# Patient Record
Sex: MALE | Race: WHITE | Employment: UNEMPLOYED | URBAN - METROPOLITAN AREA
[De-identification: names, ages, dates, MRNs, and addresses within clinical notes are randomized per-mention and may not be internally consistent; named-entity substitution may affect disease eponyms.]

---

## 2021-06-18 ENCOUNTER — HOSPITAL ENCOUNTER (EMERGENCY)
Age: 51
Discharge: HOME | End: 2021-06-18
Attending: STUDENT IN AN ORGANIZED HEALTH CARE EDUCATION/TRAINING PROGRAM
Payer: COMMERCIAL

## 2021-06-18 VITALS
HEART RATE: 89 BPM | TEMPERATURE: 98 F | DIASTOLIC BLOOD PRESSURE: 75 MMHG | RESPIRATION RATE: 18 BRPM | SYSTOLIC BLOOD PRESSURE: 132 MMHG | OXYGEN SATURATION: 97 %

## 2021-06-18 DIAGNOSIS — F15.10 METHAMPHETAMINE ABUSE (HCC): Primary | ICD-10-CM

## 2021-06-18 LAB
A/G RATIO: 1.4 (ref 1.1–2.2)
ACETAMINOPHEN LEVEL: <5 UG/ML (ref 10–30)
ALBUMIN SERPL-MCNC: 4.1 G/DL (ref 3.4–5)
ALP BLD-CCNC: 86 U/L (ref 40–129)
ALT SERPL-CCNC: 38 U/L (ref 10–40)
AMPHETAMINE SCREEN, URINE: POSITIVE
ANION GAP SERPL CALCULATED.3IONS-SCNC: 21 MMOL/L (ref 3–16)
AST SERPL-CCNC: 33 U/L (ref 15–37)
BARBITURATE SCREEN URINE: ABNORMAL
BASOPHILS ABSOLUTE: 0.1 K/UL (ref 0–0.2)
BASOPHILS RELATIVE PERCENT: 0.7 %
BENZODIAZEPINE SCREEN, URINE: ABNORMAL
BILIRUB SERPL-MCNC: 0.4 MG/DL (ref 0–1)
BUN BLDV-MCNC: 12 MG/DL (ref 7–20)
CALCIUM SERPL-MCNC: 9.3 MG/DL (ref 8.3–10.6)
CANNABINOID SCREEN URINE: POSITIVE
CHLORIDE BLD-SCNC: 102 MMOL/L (ref 99–110)
CO2: 16 MMOL/L (ref 21–32)
COCAINE METABOLITE SCREEN URINE: ABNORMAL
CREAT SERPL-MCNC: 0.9 MG/DL (ref 0.9–1.3)
EOSINOPHILS ABSOLUTE: 0.1 K/UL (ref 0–0.6)
EOSINOPHILS RELATIVE PERCENT: 0.6 %
ETHANOL: 37 MG/DL (ref 0–0.08)
GFR AFRICAN AMERICAN: >60
GFR NON-AFRICAN AMERICAN: >60
GLOBULIN: 3 G/DL
GLUCOSE BLD-MCNC: 124 MG/DL (ref 70–99)
HCT VFR BLD CALC: 39.6 % (ref 40.5–52.5)
HEMOGLOBIN: 13.2 G/DL (ref 13.5–17.5)
LYMPHOCYTES ABSOLUTE: 1.6 K/UL (ref 1–5.1)
LYMPHOCYTES RELATIVE PERCENT: 15.2 %
Lab: ABNORMAL
MAGNESIUM: 2.1 MG/DL (ref 1.8–2.4)
MCH RBC QN AUTO: 29.9 PG (ref 26–34)
MCHC RBC AUTO-ENTMCNC: 33.4 G/DL (ref 31–36)
MCV RBC AUTO: 89.6 FL (ref 80–100)
METHADONE SCREEN, URINE: ABNORMAL
MONOCYTES ABSOLUTE: 0.6 K/UL (ref 0–1.3)
MONOCYTES RELATIVE PERCENT: 5.4 %
NEUTROPHILS ABSOLUTE: 8 K/UL (ref 1.7–7.7)
NEUTROPHILS RELATIVE PERCENT: 78.1 %
OPIATE SCREEN URINE: ABNORMAL
OXYCODONE URINE: ABNORMAL
PDW BLD-RTO: 14.7 % (ref 12.4–15.4)
PH UA: 5
PHENCYCLIDINE SCREEN URINE: ABNORMAL
PLATELET # BLD: 247 K/UL (ref 135–450)
PMV BLD AUTO: 8.9 FL (ref 5–10.5)
POTASSIUM REFLEX MAGNESIUM: 3.5 MMOL/L (ref 3.5–5.1)
PROPOXYPHENE SCREEN: ABNORMAL
RBC # BLD: 4.42 M/UL (ref 4.2–5.9)
SALICYLATE, SERUM: <0.3 MG/DL (ref 15–30)
SARS-COV-2, NAAT: NOT DETECTED
SODIUM BLD-SCNC: 139 MMOL/L (ref 136–145)
TOTAL PROTEIN: 7.1 G/DL (ref 6.4–8.2)
WBC # BLD: 10.2 K/UL (ref 4–11)

## 2021-06-18 PROCEDURE — 2580000003 HC RX 258: Performed by: STUDENT IN AN ORGANIZED HEALTH CARE EDUCATION/TRAINING PROGRAM

## 2021-06-18 PROCEDURE — 83735 ASSAY OF MAGNESIUM: CPT

## 2021-06-18 PROCEDURE — 87635 SARS-COV-2 COVID-19 AMP PRB: CPT

## 2021-06-18 PROCEDURE — 80307 DRUG TEST PRSMV CHEM ANLYZR: CPT

## 2021-06-18 PROCEDURE — 82077 ASSAY SPEC XCP UR&BREATH IA: CPT

## 2021-06-18 PROCEDURE — 99285 EMERGENCY DEPT VISIT HI MDM: CPT

## 2021-06-18 PROCEDURE — 80179 DRUG ASSAY SALICYLATE: CPT

## 2021-06-18 PROCEDURE — 80053 COMPREHEN METABOLIC PANEL: CPT

## 2021-06-18 PROCEDURE — 85025 COMPLETE CBC W/AUTO DIFF WBC: CPT

## 2021-06-18 PROCEDURE — 80143 DRUG ASSAY ACETAMINOPHEN: CPT

## 2021-06-18 RX ORDER — 0.9 % SODIUM CHLORIDE 0.9 %
1000 INTRAVENOUS SOLUTION INTRAVENOUS ONCE
Status: COMPLETED | OUTPATIENT
Start: 2021-06-18 | End: 2021-06-18

## 2021-06-18 RX ADMIN — SODIUM CHLORIDE 1000 ML: 9 INJECTION, SOLUTION INTRAVENOUS at 13:15

## 2021-06-18 NOTE — ED NOTES
Bed: 02  Expected date:   Expected time:   Means of arrival:   Comments:  eulalio Cortez [facility]  06/18/21 5567

## 2021-06-18 NOTE — ED NOTES
Patient brought to the [facility] AN AFFILIATE OF [facility] without report and without his SOB. Patient stated that he did meth and started seeing things and got scared. He states I don't need to be here. Instructed patient that this writer would speak with the doctor. Explained the situation to the doctor and she agreed that the patient did not need to see psych. Spoke with Art about the patient coming back out to the main ED be discharged.       Ingrid Brooke RN  06/18/21 0794

## 2021-06-18 NOTE — ED NOTES
Pt's fluids are finished. Pt says he still does not need to urinate. Made pt aware that is the only thing we are waiting on at this time.      Nicole Cevallos RN  06/18/21 5972

## 2021-06-18 NOTE — ED PROVIDER NOTES
[address] ED      CHIEF COMPLAINT  Hallucinations (patient brought in by police for hallucinations. Patient believes people are chasing him trying to kill him. Patient states he is \"going to die today\" they are going to track him down to kill him. Denies SI or HI)     HISTORY OF PRESENT ILLNESS  Nirav Chan is a 52 y.o. male  who presents to the ED complaining of need for psychiatric evaluation. Patient reportedly having hallucinations, brought in by police. He states that people are chasing him and trying to kill him. He denies suicidal or homicidal ideation, is otherwise cooperative. He denies any other complaints or concerns, states that he did drink something earlier today and that he is sure that there were drugs in it and that he will definitely test positive for drugs but denies any drug use. He denies any other complaints or concerns at this time. No other complaints, modifying factors or associated symptoms. I have reviewed the following from the nursing documentation. Past Medical History:   Diagnosis Date    Pericarditis      History reviewed. No pertinent surgical history. History reviewed. No pertinent family history.   Social History     Socioeconomic History    Marital status:      Spouse name: Not on file    Number of children: Not on file    Years of education: Not on file    Highest education level: Not on file   Occupational History    Not on file   Tobacco Use    Smoking status: Current Every Day Smoker     Packs/day: 0.50     Types: Cigarettes   Substance and Sexual Activity    Alcohol use: Yes     Comment: couple times week    Drug use: Yes     Types: Methamphetamines    Sexual activity: Not on file   Other Topics Concern    Not on file   Social History Narrative    Not on file     Social Determinants of Health     Financial Resource Strain:     Difficulty of Paying Living Expenses:    Food Insecurity:     Worried About Running Out of Food in the Last Year:    [address] in the Last Year:    Transportation Needs:     Lack of Transportation (Medical):  Lack of Transportation (Non-Medical):    Physical Activity:     Days of Exercise per Week:     Minutes of Exercise per Session:    Stress:     Feeling of Stress :    Social Connections:     Frequency of Communication with Friends and Family:     Frequency of Social Gatherings with Friends and Family:     Attends Tenriism Services:     Active Member of Clubs or Organizations:     Attends Club or Organization Meetings:     Marital Status:    Intimate Partner Violence:     Fear of Current or Ex-Partner:     Emotionally Abused:     Physically Abused:     Sexually Abused:      No current facility-administered medications for this encounter. Current Outpatient Medications   Medication Sig Dispense Refill    ondansetron (ZOFRAN ODT) 4 MG disintegrating tablet Take 2 tablets by mouth every 8 hours as needed for Nausea 20 tablet 0     Allergies   Allergen Reactions    Vicodin [Hydrocodone-Acetaminophen] Nausea And Vomiting       REVIEW OF SYSTEMS  10 systems reviewed, pertinent positives per HPI otherwise noted to be negative. PHYSICAL EXAM  /75   Pulse 89   Temp 98 °F (36.7 °C) (Oral)   Resp 18   SpO2 97%    GENERAL APPEARANCE: Awake and alert. Cooperative. No acute distress. HENT: Normocephalic. Atraumatic. NECK: Supple. EYES: PERRL. EOM's grossly intact. HEART/CHEST: RRR. No murmurs. LUNGS: Respirations unlabored. CTAB. Good air exchange. Speaking comfortably in full sentences. ABDOMEN: No tenderness. Soft. Non-distended. No masses. No organomegaly. No guarding or rebound. MUSCULOSKELETAL: No extremity edema. Compartments soft. No deformity. No tenderness in the extremities. All extremities neurovascularly intact. SKIN: Warm and dry. No acute rashes. NEUROLOGICAL: Alert and oriented. CN's 2-12 intact. No gross facial drooping.  Strength 5/5, sensation intact. Gait normal.  PSYCHIATRIC: Paranoid    LABS  I have reviewed all labs for this visit.    Results for orders placed or performed during the hospital encounter of 06/18/21   COVID-19, Rapid    Specimen: Nasopharyngeal Swab   Result Value Ref Range    SARS-CoV-2, NAAT Not Detected Not Detected   CBC Auto Differential   Result Value Ref Range    WBC 10.2 4.0 - 11.0 K/uL    RBC 4.42 4.20 - 5.90 M/uL    Hemoglobin 13.2 (L) 13.5 - 17.5 g/dL    Hematocrit 39.6 (L) 40.5 - 52.5 %    MCV 89.6 80.0 - 100.0 fL    MCH 29.9 26.0 - 34.0 pg    MCHC 33.4 31.0 - 36.0 g/dL    RDW 14.7 12.4 - 15.4 %    Platelets 628 134 - 339 K/uL    MPV 8.9 5.0 - 10.5 fL    Neutrophils % 78.1 %    Lymphocytes % 15.2 %    Monocytes % 5.4 %    Eosinophils % 0.6 %    Basophils % 0.7 %    Neutrophils Absolute 8.0 (H) 1.7 - 7.7 K/uL    Lymphocytes Absolute 1.6 1.0 - 5.1 K/uL    Monocytes Absolute 0.6 0.0 - 1.3 K/uL    Eosinophils Absolute 0.1 0.0 - 0.6 K/uL    Basophils Absolute 0.1 0.0 - 0.2 K/uL   Comprehensive Metabolic Panel w/ Reflex to MG   Result Value Ref Range    Sodium 139 136 - 145 mmol/L    Potassium reflex Magnesium 3.5 3.5 - 5.1 mmol/L    Chloride 102 99 - 110 mmol/L    CO2 16 (L) 21 - 32 mmol/L    Anion Gap 21 (H) 3 - 16    Glucose 124 (H) 70 - 99 mg/dL    BUN 12 7 - 20 mg/dL    CREATININE 0.9 0.9 - 1.3 mg/dL    GFR Non-African American >60 >60    GFR African American >60 >60    Calcium 9.3 8.3 - 10.6 mg/dL    Total Protein 7.1 6.4 - 8.2 g/dL    Albumin 4.1 3.4 - 5.0 g/dL    Albumin/Globulin Ratio 1.4 1.1 - 2.2    Total Bilirubin 0.4 0.0 - 1.0 mg/dL    Alkaline Phosphatase 86 40 - 129 U/L    ALT 38 10 - 40 U/L    AST 33 15 - 37 U/L    Globulin 3.0 g/dL   Drug screen multi urine   Result Value Ref Range    Amphetamine Screen, Urine POSITIVE (A) Negative <1000ng/mL    Barbiturate Screen, Ur Neg Negative <200 ng/mL    Benzodiazepine Screen, Urine Neg Negative <200 ng/mL    Cannabinoid Scrn, Ur POSITIVE (A) Negative <50 ng/mL    Cocaine Metabolite Screen, Urine Neg Negative <300 ng/mL    Opiate Scrn, Ur Neg Negative <300 ng/mL    PCP Screen, Urine Neg Negative <25 ng/mL    Methadone Screen, Urine Neg Negative <300 ng/mL    Propoxyphene Scrn, Ur Neg Negative <300 ng/mL    Oxycodone Urine Neg Negative <100 ng/ml    pH, UA 5.0     Drug Screen Comment: see below    Ethanol   Result Value Ref Range    Ethanol Lvl 37 mg/dL   Acetaminophen level   Result Value Ref Range    Acetaminophen Level <5 (L) 10 - 30 ug/mL   Salicylate   Result Value Ref Range    Salicylate, Serum <0.1 (L) 15.0 - 30.0 mg/dL   Magnesium   Result Value Ref Range    Magnesium 2.10 1.80 - 2.40 mg/dL     RADIOLOGY  No orders to display     ED COURSE/MDM  Patient seen and evaluated. Old records reviewed. Labs and imaging reviewed and results discussed with patient. Patient is a 51-year-old male, presenting with concerns for hallucinations. Full HPI as detailed above. Upon arrival in the ED, vitals reassuring. Patient resting comfortably is in no acute distress. He does have some paranoid thoughts and behaviors. Lab work was obtained, labs are reassuring. Urine drug screen positive for amphetamines and cannabinoids. After stay in the ED, patient did admit to drug use and was no longer having any hallucinations. Continues to deny suicidal homicidal ideation. As he admits to drug use no believe this is the cause of his psychosis earlier, and his mental status is improved, feel that he is appropriate for discharge home. Findings are discussed with the patient and his family member at bedside and they're comfortable agreement plan of care. Patient will be discharged for given return precautions. During the patient's ED course, the patient was given:  Medications   0.9 % sodium chloride bolus (0 mLs Intravenous Stopped 6/18/21 1359)        CLINICAL IMPRESSION  1.  Methamphetamine abuse (HCC)        Blood pressure 132/75, pulse 89, temperature 98 °F (36.7 °C), temperature source Oral, resp. rate 18, SpO2 97 %. DISPOSITION  Nikky Santiago was discharged to home in good condition. Patient was given scripts for the following medications. I counseled patient how to take these medications. New Prescriptions    No medications on file       Follow-up with:  [facility]) Pre-Services  [phone]  Schedule an appointment as soon as possible for a visit       [facility] ED  [address] [facility]  [phone]  Go to   If symptoms worsen      DISCLAIMER: This chart was created using Dragon dictation software. Efforts were made by me to ensure accuracy, however some errors may be present due to limitations of this technology and occasionally words are not transcribed correctly.        Janae Kaba MD  06/18/21 4307

## 2022-05-17 ENCOUNTER — APPOINTMENT (OUTPATIENT)
Dept: GENERAL RADIOLOGY | Age: 52
End: 2022-05-17
Payer: COMMERCIAL

## 2022-05-17 ENCOUNTER — HOSPITAL ENCOUNTER (EMERGENCY)
Age: 52
Discharge: HOME | End: 2022-05-17
Attending: EMERGENCY MEDICINE
Payer: COMMERCIAL

## 2022-05-17 VITALS
HEIGHT: 77 IN | HEART RATE: 78 BPM | OXYGEN SATURATION: 100 % | SYSTOLIC BLOOD PRESSURE: 138 MMHG | TEMPERATURE: 98 F | RESPIRATION RATE: 17 BRPM | BODY MASS INDEX: 24.79 KG/M2 | DIASTOLIC BLOOD PRESSURE: 82 MMHG | WEIGHT: 210 LBS

## 2022-05-17 DIAGNOSIS — N50.89 TESTICULAR MASS: ICD-10-CM

## 2022-05-17 DIAGNOSIS — R55 SYNCOPE AND COLLAPSE: Primary | ICD-10-CM

## 2022-05-17 LAB
A/G RATIO: 1.6 (ref 1.1–2.2)
ALBUMIN SERPL-MCNC: 4.6 G/DL (ref 3.4–5)
ALP BLD-CCNC: 74 U/L (ref 40–129)
ALT SERPL-CCNC: 21 U/L (ref 10–40)
ANION GAP SERPL CALCULATED.3IONS-SCNC: 12 MMOL/L (ref 3–16)
AST SERPL-CCNC: 29 U/L (ref 15–37)
BASOPHILS ABSOLUTE: 0 K/UL (ref 0–0.2)
BASOPHILS RELATIVE PERCENT: 0.4 %
BILIRUB SERPL-MCNC: 0.5 MG/DL (ref 0–1)
BUN BLDV-MCNC: 19 MG/DL (ref 7–20)
CALCIUM SERPL-MCNC: 9.8 MG/DL (ref 8.3–10.6)
CHLORIDE BLD-SCNC: 98 MMOL/L (ref 99–110)
CO2: 25 MMOL/L (ref 21–32)
CREAT SERPL-MCNC: 0.8 MG/DL (ref 0.9–1.3)
EKG ATRIAL RATE: 69 BPM
EKG DIAGNOSIS: NORMAL
EKG P AXIS: 61 DEGREES
EKG P-R INTERVAL: 166 MS
EKG Q-T INTERVAL: 416 MS
EKG QRS DURATION: 90 MS
EKG QTC CALCULATION (BAZETT): 445 MS
EKG R AXIS: 61 DEGREES
EKG T AXIS: 57 DEGREES
EKG VENTRICULAR RATE: 69 BPM
EOSINOPHILS ABSOLUTE: 0.1 K/UL (ref 0–0.6)
EOSINOPHILS RELATIVE PERCENT: 1 %
GFR AFRICAN AMERICAN: >60
GFR NON-AFRICAN AMERICAN: >60
GLUCOSE BLD-MCNC: 131 MG/DL (ref 70–99)
HCT VFR BLD CALC: 44.6 % (ref 40.5–52.5)
HEMOGLOBIN: 14.6 G/DL (ref 13.5–17.5)
LYMPHOCYTES ABSOLUTE: 1.6 K/UL (ref 1–5.1)
LYMPHOCYTES RELATIVE PERCENT: 22.9 %
MAGNESIUM: 1.7 MG/DL (ref 1.8–2.4)
MCH RBC QN AUTO: 28.3 PG (ref 26–34)
MCHC RBC AUTO-ENTMCNC: 32.8 G/DL (ref 31–36)
MCV RBC AUTO: 86.2 FL (ref 80–100)
MONOCYTES ABSOLUTE: 0.6 K/UL (ref 0–1.3)
MONOCYTES RELATIVE PERCENT: 8.1 %
NEUTROPHILS ABSOLUTE: 4.8 K/UL (ref 1.7–7.7)
NEUTROPHILS RELATIVE PERCENT: 67.6 %
PDW BLD-RTO: 13.7 % (ref 12.4–15.4)
PLATELET # BLD: 156 K/UL (ref 135–450)
PMV BLD AUTO: 10.6 FL (ref 5–10.5)
POTASSIUM REFLEX MAGNESIUM: 3.4 MMOL/L (ref 3.5–5.1)
RBC # BLD: 5.17 M/UL (ref 4.2–5.9)
SODIUM BLD-SCNC: 135 MMOL/L (ref 136–145)
TOTAL PROTEIN: 7.5 G/DL (ref 6.4–8.2)
TROPONIN: <0.01 NG/ML
WBC # BLD: 7.1 K/UL (ref 4–11)

## 2022-05-17 PROCEDURE — 80053 COMPREHEN METABOLIC PANEL: CPT

## 2022-05-17 PROCEDURE — 99285 EMERGENCY DEPT VISIT HI MDM: CPT

## 2022-05-17 PROCEDURE — 6370000000 HC RX 637 (ALT 250 FOR IP): Performed by: EMERGENCY MEDICINE

## 2022-05-17 PROCEDURE — 93005 ELECTROCARDIOGRAM TRACING: CPT | Performed by: EMERGENCY MEDICINE

## 2022-05-17 PROCEDURE — 2580000003 HC RX 258: Performed by: EMERGENCY MEDICINE

## 2022-05-17 PROCEDURE — 71046 X-RAY EXAM CHEST 2 VIEWS: CPT

## 2022-05-17 PROCEDURE — 85025 COMPLETE CBC W/AUTO DIFF WBC: CPT

## 2022-05-17 PROCEDURE — 83735 ASSAY OF MAGNESIUM: CPT

## 2022-05-17 PROCEDURE — 84484 ASSAY OF TROPONIN QUANT: CPT

## 2022-05-17 PROCEDURE — 93010 ELECTROCARDIOGRAM REPORT: CPT | Performed by: INTERNAL MEDICINE

## 2022-05-17 PROCEDURE — 96360 HYDRATION IV INFUSION INIT: CPT

## 2022-05-17 RX ORDER — LANOLIN ALCOHOL/MO/W.PET/CERES
CREAM (GRAM) TOPICAL
Status: DISCONTINUED
Start: 2022-05-17 | End: 2022-05-17 | Stop reason: HOSPADM

## 2022-05-17 RX ORDER — LANOLIN ALCOHOL/MO/W.PET/CERES
400 CREAM (GRAM) TOPICAL ONCE
Status: COMPLETED | OUTPATIENT
Start: 2022-05-17 | End: 2022-05-17

## 2022-05-17 RX ORDER — 0.9 % SODIUM CHLORIDE 0.9 %
1000 INTRAVENOUS SOLUTION INTRAVENOUS ONCE
Status: COMPLETED | OUTPATIENT
Start: 2022-05-17 | End: 2022-05-17

## 2022-05-17 RX ADMIN — SODIUM CHLORIDE 1000 ML: 9 INJECTION, SOLUTION INTRAVENOUS at 03:25

## 2022-05-17 RX ADMIN — Medication 400 MG: at 04:06

## 2022-05-17 RX ADMIN — POTASSIUM BICARBONATE 20 MEQ: 782 TABLET, EFFERVESCENT ORAL at 04:06

## 2022-05-17 ASSESSMENT — ENCOUNTER SYMPTOMS
SHORTNESS OF BREATH: 0
BACK PAIN: 0
RHINORRHEA: 0
ABDOMINAL PAIN: 0

## 2022-05-17 ASSESSMENT — PAIN - FUNCTIONAL ASSESSMENT: PAIN_FUNCTIONAL_ASSESSMENT: NONE - DENIES PAIN

## 2022-05-17 NOTE — ED NOTES
Pt ok to d/c to home. Pt given d/c instructions. Pt verbalized understating including Rx and follow up care. Pt ambulated to lobby for ride home.  0 s/s of distress at time of d/c.          Vega Lam, EMMY  05/17/22 6542

## 2022-05-17 NOTE — ED PROVIDER NOTES
[address] [facility]  ED  EMERGENCY DEPARTMENT ENCOUNTER      Pt Name: David Egan  MRN: [id number]  [place] 1970  Date of evaluation: 5/17/2022  Provider: Kevin Chacko MD    CHIEF COMPLAINT       Chief Complaint   Patient presents with    Altered Mental Status     was unresponsive at home, states he could not talk or move, pt A&O at time of arrival. dialated pupils, possible etoh or \"drugs\" on board. Hx of meth         HISTORY OF PRESENT ILLNESS   (Location/Symptom, Timing/Onset,Context/Setting, Quality, Duration, Modifying Factors, Severity)  Note limiting factors. David Egan is a 54 y.o. male who presents to the emergency department for AMS. Patient had been out working out in the heat all day, he said he has not had a lot of water to drink. He admits to drinking Vodka, also used cocaine. Went home. Was getting ready for bed, remembers feeling, very hot and thirsty and felt a pop in his head and that was the last thing that he remembers. EMS states that they were called  Because the patient was unresponsive but when they got there the patient was awake and oriented. No chest pain no SOB. No vision changes but felt like he could not speak earlier and felt really tense when he came to. Last night had cramps. Nursing notes were reviewed. REVIEW OF SYSTEMS    (2-9 systems for level 4, 10 or more for level 5)     Review of Systems   Constitutional: Negative for fever. HENT: Negative for rhinorrhea. Eyes: Negative for visual disturbance. Respiratory: Negative for shortness of breath. Cardiovascular: Negative for chest pain. Gastrointestinal: Negative for abdominal pain. Genitourinary: Negative for dysuria. Testicular masses. Musculoskeletal: Negative for back pain. Neurological: Negative for headaches.          PAST MEDICAL HISTORY     Past Medical History:   Diagnosis Date    Pericarditis          SURGICALHISTORY     No past surgical history on file.      CURRENT MEDICATIONS       Discharge Medication List as of 5/17/2022  4:12 AM      CONTINUE these medications which have NOT CHANGED    Details   ondansetron (ZOFRAN ODT) 4 MG disintegrating tablet Take 2 tablets by mouth every 8 hours as needed for Nausea, Disp-20 tablet, R-0Print             ALLERGIES     Vicodin [hydrocodone-acetaminophen]    FAMILY HISTORY     No family history on file. SOCIAL HISTORY       Social History     Socioeconomic History    Marital status:      Spouse name: Not on file    Number of children: Not on file    Years of education: Not on file    Highest education level: Not on file   Occupational History    Not on file   Tobacco Use    Smoking status: Current Every Day Smoker     Packs/day: 0.50     Types: Cigarettes    Smokeless tobacco: Not on file   Substance and Sexual Activity    Alcohol use: Yes     Comment: couple times week    Drug use: Yes     Types: Methamphetamines (Crystal Meth)    Sexual activity: Not on file   Other Topics Concern    Not on file   Social History Narrative    Not on file     Social Determinants of Health     Financial Resource Strain:     Difficulty of Paying Living Expenses: Not on file   Food Insecurity:     Worried About Running Out of Food in the Last Year: Not on file    Liya of Food in the Last Year: Not on file   Transportation Needs:     Lack of Transportation (Medical): Not on file    Lack of Transportation (Non-Medical):  Not on file   Physical Activity:     Days of Exercise per Week: Not on file    Minutes of Exercise per Session: Not on file   Stress:     Feeling of Stress : Not on file   Social Connections:     Frequency of Communication with Friends and Family: Not on file    Frequency of Social Gatherings with Friends and Family: Not on file    Attends Mandaen Services: Not on file    Active Member of Clubs or Organizations: Not on file    Attends Club or Organization Meetings: Not on file   [facility] Marital Status: Not on file   Intimate Partner Violence:     Fear of Current or Ex-Partner: Not on file    Emotionally Abused: Not on file    Physically Abused: Not on file    Sexually Abused: Not on file   Housing Stability:     Unable to Pay for Housing in the Last Year: Not on file    Number of [place] in the Last Year: Not on file    Unstable Housing in the Last Year: Not on file       SCREENINGS    [place] Coma Scale  Eye Opening: Spontaneous  Best Verbal Response: Oriented  Best Motor Response: Obeys commands  [place] Coma Scale Score: 15        PHYSICAL EXAM    (up to 7 for level 4, 8 or more for level 5)     ED Triage Vitals [05/17/22 0146]   BP Temp Temp src Pulse Resp SpO2 Height Weight   (!) 143/86 98 °F (36.7 °C) -- 93 16 99 % 6' 5\" (1.956 m) 210 lb (95.3 kg)       Physical Exam  Vitals and nursing note reviewed. Constitutional:       Appearance: Normal appearance. He is well-developed. He is not ill-appearing. HENT:      Head: Normocephalic and atraumatic. Right Ear: External ear normal.      Left Ear: External ear normal.      Nose: Nose normal.      Mouth/Throat:      Mouth: Mucous membranes are dry. Eyes:      General: No scleral icterus. Right eye: No discharge. Left eye: No discharge. Conjunctiva/sclera: Conjunctivae normal.   Cardiovascular:      Rate and Rhythm: Normal rate and regular rhythm. Heart sounds: Normal heart sounds. Pulmonary:      Effort: Pulmonary effort is normal. No respiratory distress. Breath sounds: Normal breath sounds. No wheezing or rales. Abdominal:      General: Bowel sounds are normal. There is no distension. Palpations: Abdomen is soft. Tenderness: There is no abdominal tenderness. There is no guarding or rebound. Hernia: A hernia is present. Hernia is present in the umbilical area. Genitourinary:     Comments: Large left testicular mass  Musculoskeletal:      Cervical back: Neck supple.    Skin: Coloration: Skin is not pale. Neurological:      Mental Status: He is alert. Psychiatric:         Mood and Affect: Mood normal.         Behavior: Behavior normal.           DIAGNOSTIC RESULTS     EKG: All EKG's are interpreted by the Emergency Department Physician who either signs or Co-signs this chart in the absence of a cardiologist.    12 lead EKG shows normal sinus rhythm at a rate of 69 bpm, OH interval QRS QT is normal.  Normal axis no acute ischemic changes no significant changes when compared to September 2015. RADIOLOGY:   Non-plain film images such as CT, Ultrasound and MRI are read by the radiologist. Plain radiographic images are visualized and preliminarily interpreted by the emergency physician with the below findings:      Interpretation per the Radiologist below, if available at the time of this note:    XR CHEST (2 VW)   Final Result   Clear chest without acute cardiopulmonary process. ED BEDSIDE ULTRASOUND:   Performed by ED Physician - none    LABS:  Labs Reviewed   CBC WITH AUTO DIFFERENTIAL - Abnormal; Notable for the following components:       Result Value    MPV 10.6 (*)     All other components within normal limits   COMPREHENSIVE METABOLIC PANEL W/ REFLEX TO MG FOR LOW K - Abnormal; Notable for the following components:    Sodium 135 (*)     Potassium reflex Magnesium 3.4 (*)     Chloride 98 (*)     Glucose 131 (*)     CREATININE 0.8 (*)     All other components within normal limits   MAGNESIUM - Abnormal; Notable for the following components:    Magnesium 1.70 (*)     All other components within normal limits   TROPONIN       All other labs were within normal range or not returned as of this dictation.     EMERGENCY DEPARTMENT COURSE and DIFFERENTIAL DIAGNOSIS/MDM:   Vitals:    Vitals:    05/17/22 0200 05/17/22 0300 05/17/22 0326 05/17/22 0345   BP: (!) 140/84 (!) 144/92 136/84 138/82   Pulse: 79 83 72 78   Resp: 15 18 16 17   Temp:   98 °F (36.7 °C)    TempSrc: Oral    SpO2: 99% 99% 100% 100%   Weight:       Height:           Adult male who came into the hospital for syncopal episode. Patient admits to not drinking lots of fluids a day into doing drugs and alcohol. Patient has no complaints at this time. Laboratory studies chest x-ray EKG ordered. Patient is placed on cardiac blood pressure and pulse oximetry monitoring he does have dry mucous membranes he is given IV fluids. Patient has a large testicular mass. This has been since 2014 when he first noticed it. He has not followed up for this. Diagnostic work-up here is unremarkable except for signs of dehydration. Patient has been given IV fluids. Again the patient remains asymptomatic here. Patient will be discharged follow-up in the outpatient setting. Patient is counseled against substance abuse. Patient also given follow-up to urology. CRITICAL CARE TIME   None       CONSULTS:  None    PROCEDURES:       Procedures    FINAL IMPRESSION      1. Syncope and collapse    2.  Testicular mass          DISPOSITION/PLAN   DISPOSITION Decision To Discharge 05/17/2022 03:59:46 AM      PATIENT REFERREDTO:  [facility]  [address] [place] [address]  Schedule an appointment as soon as possible for a visit       Asha Kennedy DO  [address] [place], [place] [address]alyssa[facility] [address] [phone]    Schedule an appointment as soon as possible for a visit         DISCHARGEMEDICATIONS:  Discharge Medication List as of 5/17/2022  4:12 AM             (Please note that portions of this note were completed with a voice recognition program.  Efforts were made to edit the dictations but occasionally words are mis-transcribed.)    Joaquin Rodriges MD (electronically signed)  Attending Emergency Physician         Joaquin Rodriges MD  05/17/22 8906

## 2022-05-24 NOTE — PROGRESS NOTES
[address] and [facility]                                             [address] [facility],  [facility], [address] [place] [address]        Phone: [phone]                                     Name:  Lula Ramesh  :    1970      Consultants:   Patient Care Team:  Kori Mota DO as PCP - General (Family Medicine)    Chief Complaint:     Lula Ramesh is a 54 y.o. male  who presents today for a New Patient care visit with Personalized Prevention Plan Services as noted below. HPI:     Patient is a 57-year-old male with a past medical history of pericarditis who presents today to establish care. Umbilical hernia  Patient states he noticed a hernia over the last year. He states that it is reducible. He states he is moving his bowels without issue. He states however that it does get stuck sometimes. He states it has never become red swollen or painful. Testicular Mass  Patient states that while he was incarcerated in  or  he noticed his testicular mass. He states he was checked out by a doctor at this time but said that they would get no bigger. Recently the mass has become larger. He denies any trouble urinating or penile discharge. He states that it is not red or swollen. He noticed increased discomfort starting this winter. He was seen at the ER 2 weeks ago and was given a referral to the urology group as well as a testicular ultrasound. Depression  Patient states that his depression and anxiety is coming from his pain related to the testicular mass and his umbilical hernia. His mother also has multiple forms of cancer at this time and his brother is in senior living and this is weighing heavily on him. He denies any suicidal or homicidal ideation at this time. At this time he is not open to starting medication but would like to talk to somebody. Social Hx  Patient states that his diet is just okay. He states he is exercising. He does admit to smoking less than a pack per day. And he is actively trying to quit. He endorses occasional alcohol use. He does admit to methamphetamine, cocaine and THC use. He is not currently sexually active. There is no problem list on file for this patient. Past Medical History:    Past Medical History:   Diagnosis Date    Acute pancreatitis     Pericarditis        Past Surgical History:  History reviewed. No pertinent surgical history. Home Meds:  Prior to Visit Medications    Not on File       Allergies:    Vicodin [hydrocodone-acetaminophen]    Family History:   History reviewed. No pertinent family history. Social History:  Social History     Tobacco History     Smoking Status  Current Every Day Smoker Smoking Frequency  0.5 packs/day Smoking Tobacco Type  Cigarettes    Smokeless Tobacco Use  Unknown          Alcohol History     Alcohol Use Status  Yes Comment  couple times week          Drug Use     Drug Use Status  Yes Types  Methamphetamines (Crystal Meth)          Sexual Activity     Sexually Active  Not Asked                   Health Maintenance Completed:  Health Maintenance   Topic Date Due    Pneumococcal 0-64 years Vaccine (1 - PCV) Never done    HIV screen  Never done    Diabetes screen  Never done    Lipids  Never done    Colorectal Cancer Screen  Never done    COVID-19 Vaccine (2 - Booster for DocASAP series) 01/04/2022    DTaP/Tdap/Td vaccine (1 - Tdap) 06/15/2022 (Originally 9/20/1991)    Flu vaccine (Season Ended) 09/01/2022    Depression Monitoring  05/25/2023    Hepatitis C screen  Completed    Hepatitis A vaccine  Aged Out    Hepatitis B vaccine  Aged Out    Hib vaccine  Aged Out    Meningococcal (ACWY) vaccine  Aged [employer] History   Administered Date(s) Administered    COVID-19, J&J, PF, 0.5 mL 11/09/2021         Review of Systems:  Review of Systems   Constitutional: Negative for chills and fever. HENT: Negative for congestion and rhinorrhea. Eyes: Negative for visual disturbance. Respiratory: Negative for cough and shortness of breath. Cardiovascular: Negative for chest pain. Gastrointestinal: Negative for abdominal pain, constipation and diarrhea. Genitourinary: Positive for scrotal swelling and testicular pain. Negative for difficulty urinating and penile pain. Physical Exam:   Vitals:    05/25/22 1123   BP: 116/72   Site: Left Upper Arm   Position: Sitting   Cuff Size: Medium Adult   Pulse: 79   Resp: 18   Temp: 98 °F (36.7 °C)   TempSrc: Infrared   SpO2: 95%   Weight: 211 lb 6.4 oz (95.9 kg)   Height: 6' 5\" (1.956 m)     Body mass index is 25.07 kg/m². Wt Readings from Last 3 Encounters:   05/25/22 211 lb 6.4 oz (95.9 kg)   05/17/22 210 lb (95.3 kg)   06/14/17 200 lb (90.7 kg)       BP Readings from Last 3 Encounters:   05/25/22 116/72   05/17/22 138/82   06/18/21 132/75       Physical Exam  Constitutional:       General: He is not in acute distress. Appearance: Normal appearance. He is normal weight. HENT:      Head: Normocephalic and atraumatic. Right Ear: Tympanic membrane, ear canal and external ear normal. There is no impacted cerumen. Left Ear: Tympanic membrane, ear canal and external ear normal. There is no impacted cerumen. Nose: Nose normal. No congestion. Mouth/Throat:      Mouth: Mucous membranes are moist.      Pharynx: Oropharynx is clear. No oropharyngeal exudate or posterior oropharyngeal erythema. Cardiovascular:      Rate and Rhythm: Normal rate and regular rhythm. Pulses: Normal pulses. Heart sounds: Normal heart sounds. No murmur heard. No friction rub. No gallop. Pulmonary:      Effort: Pulmonary effort is normal.      Breath sounds: Normal breath sounds. No wheezing, rhonchi or rales. Abdominal:      General: Abdomen is flat. Bowel sounds are normal. There is no distension. Palpations: Abdomen is soft. Tenderness: There is no abdominal tenderness. Hernia: A hernia (umbilical) is present. Musculoskeletal:         General: Normal range of motion. Cervical back: Normal range of motion and neck supple. No rigidity or tenderness. Lymphadenopathy:      Cervical: No cervical adenopathy. Skin:     General: Skin is warm and dry. Neurological:      General: No focal deficit present. Mental Status: He is alert and oriented to person, place, and time. Psychiatric:         Attention and Perception: Attention normal.         Mood and Affect: Mood normal.         Speech: Speech normal.         Behavior: Behavior normal. Behavior is cooperative. Thought Content: Thought content normal. Thought content does not include homicidal or suicidal ideation. Thought content does not include homicidal or suicidal plan.               Lab Review:   Admission on 05/17/2022, Discharged on 05/17/2022   Component Date Value    WBC 05/17/2022 7.1     RBC 05/17/2022 5.17     Hemoglobin 05/17/2022 14.6     Hematocrit 05/17/2022 44.6     MCV 05/17/2022 86.2     MCH 05/17/2022 28.3     MCHC 05/17/2022 32.8     RDW 05/17/2022 13.7     Platelets 79/64/3766 156     MPV 05/17/2022 10.6*    Neutrophils % 05/17/2022 67.6     Lymphocytes % 05/17/2022 22.9     Monocytes % 05/17/2022 8.1     Eosinophils % 05/17/2022 1.0     Basophils % 05/17/2022 0.4     Neutrophils Absolute 05/17/2022 4.8     Lymphocytes Absolute 05/17/2022 1.6     Monocytes Absolute 05/17/2022 0.6     Eosinophils Absolute 05/17/2022 0.1     Basophils Absolute 05/17/2022 0.0     Sodium 05/17/2022 135*    Potassium reflex Magnesi* 05/17/2022 3.4*    Chloride 05/17/2022 98*    CO2 05/17/2022 25     Anion Gap 05/17/2022 12     Glucose 05/17/2022 131*    BUN 05/17/2022 19     CREATININE 05/17/2022 0.8*    GFR Non- 05/17/2022 >60     GFR  05/17/2022 >60     Calcium 05/17/2022 9.8     Total Protein 05/17/2022 7.5     Albumin 05/17/2022 4.6     Albumin/Globulin Ratio 05/17/2022 1.6     Total Bilirubin 05/17/2022 0.5     Alkaline Phosphatase 05/17/2022 74     ALT 05/17/2022 21     AST 05/17/2022 29     Troponin 05/17/2022 <0.01     Ventricular Rate 05/17/2022 69     Atrial Rate 05/17/2022 69     P-R Interval 05/17/2022 166     QRS Duration 05/17/2022 90     Q-T Interval 05/17/2022 416     QTc Calculation (Bazett) 05/17/2022 445     P Axis 05/17/2022 61     R Axis 05/17/2022 61     T Axis 05/17/2022 57     Diagnosis 05/17/2022 Normal sinus rhythmNormal ECGWhen compared with ECG of 17-MAY-2022 03:25,No significant change was foundConfirmed by Shwetha Doe ([id number]) on 5/17/2022 2:52:01 PM     Magnesium 05/17/2022 1.70*          Assessment/Plan:  Patient is a 68-year-old male with a past medical history of pericarditis who presents today to establish care. 1. Reducible umbilical hernia  - Not at goal  - Referral made to general surgery    2. Testicular mass  - Not at goal  - Order printed from ER for testicular ultrasound  - Referral made to urology. 3. Polysubstance abuse ([facility] [address]  -Not at goal  - Patient is precontemplative  - Explained to patient the importance of not using and that if he ever feels like he needs help we are here to help him. 4. Anxiety and depression  - Not at goal  - Patient is not open to starting the medication at this time.  - Resources were given for mental health access point    5. Healthcare maintenance  - Will obtain hemoglobin A1c, lipid panel, CMP, TSH and HIV screening today.       Health Maintenance Due:  Health Maintenance Due   Topic Date Due    Pneumococcal 0-64 years Vaccine (1 - PCV) Never done    HIV screen  Never done    Diabetes screen  Never done    Lipids  Never done    Colorectal Cancer Screen  Never done    COVID-19 Vaccine (2 - Booster for Silke series) 01/04/2022        Health care decision maker:  <72years old      Health Maintenance: (USPSTF Recommendations)  CRC/Colonoscopy Screening: (adults 45-49 (B), 50-75 (A)): Declined  Lung Ca Screening: Annual LDCT (+smoker age 49-80, smoked within 15 years, total of 20 pack yr history (B)): Will be due for next year  HIV Screen: (15-65 yr old, and all pregnant patients (A)): Ordered  Hep C Screen: (18-79 yr old (B)): Negative  Immunizations:    RTC:  Return in about 3 months (around 8/25/2022) for follow up chronic conditions. EMR Dragon/transcription disclaimer:  Much of this encounter note is electronic transcription/translation of spoken language to printed texts. The electronic translation of spoken language may be erroneous, or at times, nonsensical words or phrases may be inadvertently transcribed.   Although I have reviewed the note for such errors, some may still exist.     Coco Rogers,    PGY-1   [facility] and [facility] Medicine Residency

## 2022-05-25 ENCOUNTER — OFFICE VISIT (OUTPATIENT)
Dept: PRIMARY CARE CLINIC | Age: 52
End: 2022-05-25
Payer: COMMERCIAL

## 2022-05-25 ENCOUNTER — HOSPITAL ENCOUNTER (OUTPATIENT)
Age: 52
Discharge: HOME | End: 2022-05-25
Payer: COMMERCIAL

## 2022-05-25 VITALS
RESPIRATION RATE: 18 BRPM | HEART RATE: 79 BPM | SYSTOLIC BLOOD PRESSURE: 116 MMHG | DIASTOLIC BLOOD PRESSURE: 72 MMHG | OXYGEN SATURATION: 95 % | BODY MASS INDEX: 24.96 KG/M2 | HEIGHT: 77 IN | WEIGHT: 211.4 LBS | TEMPERATURE: 98 F

## 2022-05-25 DIAGNOSIS — F41.9 ANXIETY AND DEPRESSION: ICD-10-CM

## 2022-05-25 DIAGNOSIS — F19.10 POLYSUBSTANCE ABUSE (HCC): ICD-10-CM

## 2022-05-25 DIAGNOSIS — Z00.00 HEALTHCARE MAINTENANCE: ICD-10-CM

## 2022-05-25 DIAGNOSIS — F32.A ANXIETY AND DEPRESSION: ICD-10-CM

## 2022-05-25 DIAGNOSIS — N50.89 TESTICULAR MASS: ICD-10-CM

## 2022-05-25 DIAGNOSIS — K42.9 REDUCIBLE UMBILICAL HERNIA: Primary | ICD-10-CM

## 2022-05-25 LAB
A/G RATIO: 2 (ref 1.1–2.2)
ALBUMIN SERPL-MCNC: 4.7 G/DL (ref 3.4–5)
ALP BLD-CCNC: 73 U/L (ref 40–129)
ALT SERPL-CCNC: 17 U/L (ref 10–40)
ANION GAP SERPL CALCULATED.3IONS-SCNC: 16 MMOL/L (ref 3–16)
AST SERPL-CCNC: 17 U/L (ref 15–37)
BILIRUB SERPL-MCNC: 0.3 MG/DL (ref 0–1)
BUN BLDV-MCNC: 10 MG/DL (ref 7–20)
CALCIUM SERPL-MCNC: 9.4 MG/DL (ref 8.3–10.6)
CHLORIDE BLD-SCNC: 101 MMOL/L (ref 99–110)
CHOLESTEROL, TOTAL: 153 MG/DL (ref 0–199)
CO2: 25 MMOL/L (ref 21–32)
CREAT SERPL-MCNC: 0.7 MG/DL (ref 0.9–1.3)
GFR AFRICAN AMERICAN: >60
GFR NON-AFRICAN AMERICAN: >60
GLUCOSE BLD-MCNC: 98 MG/DL (ref 70–99)
HDLC SERPL-MCNC: 57 MG/DL (ref 40–60)
LDL CHOLESTEROL CALCULATED: 53 MG/DL
POTASSIUM SERPL-SCNC: 4.4 MMOL/L (ref 3.5–5.1)
SODIUM BLD-SCNC: 142 MMOL/L (ref 136–145)
TOTAL PROTEIN: 7.1 G/DL (ref 6.4–8.2)
TRIGL SERPL-MCNC: 216 MG/DL (ref 0–150)
TSH REFLEX: 0.7 UIU/ML (ref 0.27–4.2)
VLDLC SERPL CALC-MCNC: 43 MG/DL

## 2022-05-25 PROCEDURE — G8419 CALC BMI OUT NRM PARAM NOF/U: HCPCS

## 2022-05-25 PROCEDURE — 80061 LIPID PANEL: CPT

## 2022-05-25 PROCEDURE — 83036 HEMOGLOBIN GLYCOSYLATED A1C: CPT

## 2022-05-25 PROCEDURE — 87390 HIV-1 AG IA: CPT

## 2022-05-25 PROCEDURE — G8427 DOCREV CUR MEDS BY ELIG CLIN: HCPCS

## 2022-05-25 PROCEDURE — 80053 COMPREHEN METABOLIC PANEL: CPT

## 2022-05-25 PROCEDURE — 4004F PT TOBACCO SCREEN RCVD TLK: CPT

## 2022-05-25 PROCEDURE — 86701 HIV-1ANTIBODY: CPT

## 2022-05-25 PROCEDURE — 86702 HIV-2 ANTIBODY: CPT

## 2022-05-25 PROCEDURE — 99204 OFFICE O/P NEW MOD 45 MIN: CPT

## 2022-05-25 PROCEDURE — 36415 COLL VENOUS BLD VENIPUNCTURE: CPT

## 2022-05-25 PROCEDURE — 84443 ASSAY THYROID STIM HORMONE: CPT

## 2022-05-25 ASSESSMENT — ENCOUNTER SYMPTOMS
RHINORRHEA: 0
CONSTIPATION: 0
ABDOMINAL PAIN: 0
DIARRHEA: 0
COUGH: 0
SHORTNESS OF BREATH: 0

## 2022-05-25 ASSESSMENT — PATIENT HEALTH QUESTIONNAIRE - PHQ9
3. TROUBLE FALLING OR STAYING ASLEEP: 3
8. MOVING OR SPEAKING SO SLOWLY THAT OTHER PEOPLE COULD HAVE NOTICED. OR THE OPPOSITE, BEING SO FIGETY OR RESTLESS THAT YOU HAVE BEEN MOVING AROUND A LOT MORE THAN USUAL: 1
10. IF YOU CHECKED OFF ANY PROBLEMS, HOW DIFFICULT HAVE THESE PROBLEMS MADE IT FOR YOU TO DO YOUR WORK, TAKE CARE OF THINGS AT HOME, OR GET ALONG WITH OTHER PEOPLE: 2
7. TROUBLE CONCENTRATING ON THINGS, SUCH AS READING THE NEWSPAPER OR WATCHING TELEVISION: 1
2. FEELING DOWN, DEPRESSED OR HOPELESS: 2
6. FEELING BAD ABOUT YOURSELF - OR THAT YOU ARE A FAILURE OR HAVE LET YOURSELF OR YOUR FAMILY DOWN: 1
SUM OF ALL RESPONSES TO PHQ QUESTIONS 1-9: 14
5. POOR APPETITE OR OVEREATING: 3
4. FEELING TIRED OR HAVING LITTLE ENERGY: 2
9. THOUGHTS THAT YOU WOULD BE BETTER OFF DEAD, OR OF HURTING YOURSELF: 0
SUM OF ALL RESPONSES TO PHQ QUESTIONS 1-9: 14
1. LITTLE INTEREST OR PLEASURE IN DOING THINGS: 1
SUM OF ALL RESPONSES TO PHQ9 QUESTIONS 1 & 2: 3

## 2022-05-25 ASSESSMENT — ANXIETY QUESTIONNAIRES
5. BEING SO RESTLESS THAT IT IS HARD TO SIT STILL: 2
4. TROUBLE RELAXING: 3
2. NOT BEING ABLE TO STOP OR CONTROL WORRYING: 2
IF YOU CHECKED OFF ANY PROBLEMS ON THIS QUESTIONNAIRE, HOW DIFFICULT HAVE THESE PROBLEMS MADE IT FOR YOU TO DO YOUR WORK, TAKE CARE OF THINGS AT HOME, OR GET ALONG WITH OTHER PEOPLE: VERY DIFFICULT
3. WORRYING TOO MUCH ABOUT DIFFERENT THINGS: 2
GAD7 TOTAL SCORE: 13
7. FEELING AFRAID AS IF SOMETHING AWFUL MIGHT HAPPEN: 1
6. BECOMING EASILY ANNOYED OR IRRITABLE: 1
1. FEELING NERVOUS, ANXIOUS, OR ON EDGE: 2

## 2022-05-25 NOTE — PATIENT INSTRUCTIONS
Mental Health Access Point  [facility] [address], [address] [facility]    Phone: [phone]    General and Laparoscopic Surgery- Da Leon MD  [address] [place] [address] [address], [address], [address] [facility]  Phone: [phone]    [facility]  [address] [place] [address]  Phone: [phone]  Fax: [phone]

## 2022-05-26 LAB
ESTIMATED AVERAGE GLUCOSE: 119.8 MG/DL
HBA1C MFR BLD: 5.8 %
HIV AG/AB: NORMAL
HIV ANTIGEN: NORMAL
HIV-1 ANTIBODY: NORMAL
HIV-2 AB: NORMAL

## 2022-05-31 ENCOUNTER — INITIAL CONSULT (OUTPATIENT)
Dept: SURGERY | Age: 52
End: 2022-05-31
Payer: COMMERCIAL

## 2022-05-31 VITALS
DIASTOLIC BLOOD PRESSURE: 71 MMHG | SYSTOLIC BLOOD PRESSURE: 133 MMHG | HEIGHT: 77 IN | TEMPERATURE: 97.9 F | WEIGHT: 205 LBS | HEART RATE: 89 BPM | BODY MASS INDEX: 24.21 KG/M2

## 2022-05-31 DIAGNOSIS — K42.9 UMBILICAL HERNIA WITHOUT OBSTRUCTION AND WITHOUT GANGRENE: Primary | ICD-10-CM

## 2022-05-31 PROCEDURE — 4004F PT TOBACCO SCREEN RCVD TLK: CPT | Performed by: SURGERY

## 2022-05-31 PROCEDURE — 99204 OFFICE O/P NEW MOD 45 MIN: CPT | Performed by: SURGERY

## 2022-05-31 PROCEDURE — G8420 CALC BMI NORM PARAMETERS: HCPCS | Performed by: SURGERY

## 2022-05-31 PROCEDURE — G8427 DOCREV CUR MEDS BY ELIG CLIN: HCPCS | Performed by: SURGERY

## 2022-05-31 RX ORDER — SODIUM CHLORIDE 0.9 % (FLUSH) 0.9 %
5-40 SYRINGE (ML) INJECTION EVERY 12 HOURS SCHEDULED
Status: CANCELLED | OUTPATIENT
Start: 2022-05-31

## 2022-05-31 RX ORDER — SODIUM CHLORIDE 9 MG/ML
INJECTION, SOLUTION INTRAVENOUS PRN
Status: CANCELLED | OUTPATIENT
Start: 2022-05-31

## 2022-05-31 RX ORDER — SODIUM CHLORIDE 0.9 % (FLUSH) 0.9 %
5-40 SYRINGE (ML) INJECTION PRN
Status: CANCELLED | OUTPATIENT
Start: 2022-05-31

## 2022-05-31 NOTE — PROGRESS NOTES
New Patient [address] Surgery Nader SORIA. [address] [facility], [address] [facility], [address] [facility], [address] [facility]  Phone: [phone]  Fax: [phone]    Miguel Martinez   YOB: 1970    Date of Visit:  5/31/2022    Terryann Gitelman, DO    HPI:   Hernia: Patient is 54y.o. year old male seen at request of Meron Nicole DO. Patient presents for evaluation of  umbilical hernia. Symptoms were first noted 1 year ago. Pain is dull, intermittent. There is a lump or mass present. Lump is reducible. Pt does not have risk factors of  chronic constipation, chronic cough, difficulty urinating, but does smoke ~1/2 pack per day. Pt. does not have previous history of prior hernia surgery. Yes  PPD 30 Pack Years 1/2 pack per day Trying to Quit? Yes      Allergies   Allergen Reactions    Vicodin [Hydrocodone-Acetaminophen] Nausea And Vomiting     No outpatient medications have been marked as taking for the 5/31/22 encounter (Initial consult) with Catarino Okeefe MD.       Past Medical History:   Diagnosis Date    Acute pancreatitis     Pericarditis      No past surgical history on file. No family history on file.   Social History     Socioeconomic History    Marital status:      Spouse name: Not on file    Number of children: Not on file    Years of education: Not on file    Highest education level: Not on file   Occupational History    Not on file   Tobacco Use    Smoking status: Current Every Day Smoker     Packs/day: 0.50     Types: Cigarettes    Smokeless tobacco: Never Used   Vaping Use    Vaping Use: Never used   Substance and Sexual Activity    Alcohol use: Yes     Comment: couple times week    Drug use: Yes     Types: Methamphetamines (Crystal Meth)     Comment: 2-3 times per week    Sexual activity: Not on file   Other Topics Concern    Not on file   Social History Narrative    Not on file     Social Determinants of Health Financial Resource Strain:     Difficulty of Paying Living Expenses: Not on file   Food Insecurity:     Worried About Running Out of Food in the Last Year: Not on file    Liya of Food in the Last Year: Not on file   Transportation Needs:     Lack of Transportation (Medical): Not on file    Lack of Transportation (Non-Medical): Not on file   Physical Activity:     Days of Exercise per Week: Not on file    Minutes of Exercise per Session: Not on file   Stress:     Feeling of Stress : Not on file   Social Connections:     Frequency of Communication with Friends and Family: Not on file    Frequency of Social Gatherings with Friends and Family: Not on file    Attends [employer] Services: Not on file    Active Member of [address] [place] [address] [employer] or Organizations: Not on file    Attends Club or Organization Meetings: Not on file    Marital Status: Not on file   Intimate Partner Violence:     Fear of Current or Ex-Partner: Not on file    Emotionally Abused: Not on file    Physically Abused: Not on file    Sexually Abused: Not on file   Housing Stability:     Unable to Pay for Housing in the Last Year: Not on file    Number of [place] in the Last Year: Not on file    Unstable Housing in the Last Year: Not on file          A review of the patient's record including allergies, medication list, tobacco history, family history, problem list, medical history and social history has been completed and updates made to the patient's EMR where indicated. Vitals:    05/31/22 1411   BP: 133/71   Site: Right Wrist   Position: Sitting   Cuff Size: Medium Adult   Pulse: 89   Temp: 97.9 °F (36.6 °C)   Weight: 205 lb (93 kg)   Height: 6' 5.01\" (1.956 m)     Body mass index is 24.3 kg/m².      Wt Readings from Last 3 Encounters:   05/31/22 205 lb (93 kg)   05/25/22 211 lb 6.4 oz (95.9 kg)   05/17/22 210 lb (95.3 kg)     BP Readings from Last 3 Encounters:   05/31/22 133/71   05/25/22 116/72   05/17/22 138/82          REVIEW OF SYSTEMS:   · All other systems reviewed; please refer to HPI with pertinent positives, all other ROS are negative    PHYSICAL EXAM:    CONSTITUTIONAL:  awake, alert, no apparent distress and normal weight  ENT:  normocepalic, without obvious abnormality  NECK:  supple, symmetrical, trachea midline   LUNGS:  Resp easy and unlabored  CARDIOVASCULAR:     ABDOMEN:  no scars,  , soft, mildly protuberant, non-tender, involuntary guarding absent,  Examination for hernias: umbilical hernia, reducible, nontender, ~2cm defect palpable. MUSCULOSKELETAL: No edema  NEUROLOGIC:  Mental Status Exam:  Level of Alertness:   awake  Orientation:   person, place, time      DATA:       ASSESSMENT:     Diagnosis Orders   1. Umbilical hernia without obstruction and without gangrene           PLAN:    We will schedule the pt for robotic umbilical hernia repair, likely via a preperitoneal approach. The technical aspects, risks, benefits and complications of the procedure were discussed with the patient, including possible hernia recurrence, or injury to surrounding structures. The pt appears to understand, asks appropriate questions, and agrees to proceed with the procedure.        Jeannine Escobar MD

## 2022-05-31 NOTE — PATIENT INSTRUCTIONS
[address] [address]  Phone: [phone]  Fax: [phone] will be scheduled for surgery with Dr. Arie Pérez. · The office will call you with the date and time that surgery is scheduled. · Please take note of these instructions for surgery:  · You should have nothing by mouth after midnight the night before your surgery - this includes no food or water. · Your surgery will be cancelled if you have taken anything by mouth after midnight, NO exceptions. · You will need to have a history and physical prior to your surgery. This will need to be completed up to 30 days before your surgery. This H/P can be completed by your family doctor or the hospital.   · IF you take coumadin (warfarin), please stop taking this medication 5 days prior to your surgery. · IF you take plavix, please stop taking this 7 days prior to your surgery. · Please contact our office if you have a pacemaker or defibrillator. · IF you are allergic to latex, please tell our office prior to your surgery. This is important in know before scheduling your surgery. · IF you are having an out patient surgery, you will need someone available to drive you home after your surgery, and to also stay with you for the rest of the day. · IF you are having a surgery requiring an inpatient stay in the hospital, you will need someone to drive you home upon discharge from the hospital.  · Please contact Dr. Alena Farrell assistant Judith Contreras if you have any questions or concerns. · Please call the office with any changes in your symptoms or further questions/concerns.  [phone]

## 2022-06-02 ENCOUNTER — TELEPHONE (OUTPATIENT)
Dept: SURGERY | Age: 52
End: 2022-06-02

## 2022-06-02 NOTE — TELEPHONE ENCOUNTER
Pt saw Dr Kami Garcia in the office 5/31/22 and was given surgery instructions for a Robotic Umbilical Hernia Repair w/ Mesh, Possible Open Procedure (General Anes) scheduled 6/15/22 @ 1:30pm arrival 11:30am [facility] - NPO after midnight roland b/f surgery - Pt will need  day of surgery - Dr Sepulveda Quiet to complete pre-op physical - Pt understood and agreed w/ above noted

## 2022-06-25 ENCOUNTER — HOSPITAL ENCOUNTER (EMERGENCY)
Age: 52
Discharge: HOME | End: 2022-06-25
Attending: STUDENT IN AN ORGANIZED HEALTH CARE EDUCATION/TRAINING PROGRAM
Payer: COMMERCIAL

## 2022-06-25 VITALS
WEIGHT: 220 LBS | RESPIRATION RATE: 16 BRPM | BODY MASS INDEX: 26.09 KG/M2 | HEART RATE: 80 BPM | OXYGEN SATURATION: 96 % | DIASTOLIC BLOOD PRESSURE: 75 MMHG | SYSTOLIC BLOOD PRESSURE: 102 MMHG | TEMPERATURE: 97.8 F

## 2022-06-25 DIAGNOSIS — S80.811A ABRASION OF MULTIPLE SITES OF RIGHT LOWER EXTREMITY, INITIAL ENCOUNTER: ICD-10-CM

## 2022-06-25 DIAGNOSIS — Z00.8 MEDICAL CLEARANCE FOR INCARCERATION: Primary | ICD-10-CM

## 2022-06-25 PROCEDURE — 99282 EMERGENCY DEPT VISIT SF MDM: CPT

## 2022-06-25 ASSESSMENT — PAIN - FUNCTIONAL ASSESSMENT: PAIN_FUNCTIONAL_ASSESSMENT: NONE - DENIES PAIN

## 2022-06-25 NOTE — ED TRIAGE NOTES
Nurse at half-way was concerned pt skin felt cool, states she wanted him medically cleared. Pt states he hasn't slept in a couple of days and used meth 4 days ago. Pt requesting water, complains his mouth is dry.

## 2022-06-25 NOTE — ED PROVIDER NOTES
[address] ED      CHIEF COMPLAINT  Medical clearance    HISTORY OF PRESENT ILLNESS  Ana Paula Lizarraga is a 54 y.o. male with a past medical history of pancreatitis hernia, and pericarditis, who presents to the ED complaining of medical clearance. Patient has warrants out for his arrest.  He was running from the police and was captured this evening. The nurse at the shelter had concerns that spite normal vital signs, the patient's skin felt cool to the touch and requested evaluation in the emergency department for medical clearance. Patient is reporting some pain in his right shin where he hit his leg on a tree while running through the woods. He has been ambulatory. He does have some abrasions, and states he feels it is just related to the abrasions. Does endorse some alcohol use this evening. Patient denies other complaints. Old records reviewed: Patient is being evaluated for outpatient surgical management of umbilical hernia    No other complaints, modifying factors or associated symptoms. I have reviewed the following from the nursing documentation. Past Medical History:   Diagnosis Date    Acute pancreatitis     Pericarditis      History reviewed. No pertinent surgical history.   Family History   Problem Relation Age of Onset   [facility] Cancer Mother     No Known Problems Father      Social History     Socioeconomic History    Marital status:      Spouse name: Not on file    Number of children: Not on file    Years of education: Not on file    Highest education level: Not on file   Occupational History    Not on file   Tobacco Use    Smoking status: Current Every Day Smoker     Packs/day: 0.50     Types: Cigarettes    Smokeless tobacco: Never Used   Vaping Use    Vaping Use: Never used   Substance and Sexual Activity    Alcohol use: Yes     Comment: couple times week    Drug use: Yes     Types: Methamphetamines (Crystal Meth)     Comment: 2-3 times per week    Sexual activity: Not on file   Other Topics Concern    Not on file   Social History Narrative    Not on file     Social Determinants of Health     Financial Resource Strain:     Difficulty of Paying Living Expenses: Not on file   Food Insecurity:     Worried About Running Out of Food in the Last Year: Not on file    Liya of Food in the Last Year: Not on file   Transportation Needs:     Lack of Transportation (Medical): Not on file    Lack of Transportation (Non-Medical): Not on file   Physical Activity:     Days of Exercise per Week: Not on file    Minutes of Exercise per Session: Not on file   Stress:     Feeling of Stress : Not on file   Social Connections:     Frequency of Communication with Friends and Family: Not on file    Frequency of Social Gatherings with Friends and Family: Not on file    Attends Sikh Services: Not on file    Active Member of [address] [place] [address] [employer] or Organizations: Not on file    Attends Club or Organization Meetings: Not on file    Marital Status: Not on file   Intimate Partner Violence:     Fear of Current or Ex-Partner: Not on file    Emotionally Abused: Not on file    Physically Abused: Not on file    Sexually Abused: Not on file   Housing Stability:     Unable to Pay for Housing in the Last Year: Not on file    Number of [place] in the Last Year: Not on file    Unstable Housing in the Last Year: Not on file     No current facility-administered medications for this encounter. No current outpatient medications on file. Allergies   Allergen Reactions    Vicodin [Hydrocodone-Acetaminophen] Nausea And Vomiting       REVIEW OF SYSTEMS  All systems reviewed, pertinent positives per HPI otherwise noted to be negative. PHYSICAL EXAM  /75   Pulse 80   Temp 97.8 °F (36.6 °C) (Oral)   Resp 16   Wt 220 lb (99.8 kg)   SpO2 96%   BMI 26.09 kg/m²    GENERAL APPEARANCE: Awake and alert. Cooperative. no distress. HENT: Normocephalic. Atraumatic.  Mucous membranes are moist  NECK: Supple. Full range of motion of the neck without stiffness or pain. EYES: PERRL. EOM's grossly intact. HEART/CHEST: RRR. No murmurs. Chest wall is not tender to palpation. LUNGS: Respirations unlabored. CTAB. Good air exchange. Speaking comfortably in full sentences. ABDOMEN: No tenderness. Umbilical hernia is reducible. Soft. Non-distended. No masses. No organomegaly. No guarding or rebound. MUSCULOSKELETAL: No extremity edema. Compartments soft. No deformity. No tenderness in the extremities. All extremities neurovascularly intact. No bony tenderness in the right lower extremity. Right lower extremity is neurovascular intact  SKIN: Warm and dry. No acute rashes. Minor abrasions to the right lower extremity. NEUROLOGICAL: Alert and oriented x3. CN's 2-12 intact. No gross facial drooping. Strength 5/5, sensation intact. PSYCHIATRIC: Normal mood and affect. LABS  I have reviewed all labs for this visit. No results found for this visit on 06/25/22. RADIOLOGY    No orders to display         ED COURSE / MDM  Patient seen and evaluated. Old records reviewed and pertinent information included in HPI. Labs and imaging reviewed and results discussed with patient. Overall well appearing patient, in no acute distress, presenting for medical clearance for prison. Physical exam remarkable for abrasions to right lower extremity    Differential diagnosis includes but is not limited to: Abrasions, fracture, need for medical clearance, alcohol intoxication    During the patient's ED course, the patient was given:  Medications - No data to display     Overall low suspicion for right lower extremity fracture. Did offer x-ray, but patient declined. Patient is ambulatory and does not have significant bony tenderness. Suspect pain is related to abrasions. Cleaning of abrasions was offered, but patient declines.     Is this patient to be included in the SEP-1 Core Measure due to severe sepsis or septic shock? No   Exclusion criteria - the patient is NOT to be included for SEP-1 Core Measure due to:  2+ SIRS criteria are not met     Patient reports alcohol use. He is alert and oriented x3. He is able to ambulate in the emergency department. He is tolerating p.o. well. Patient meets criteria for clinical sobriety. Evaluation overall reassuring. At this time, feel the patient is appropriate for discharge to follow-up with a primary care doctor. Patient feels comfortable with discharge at this time. Return precautions given. Encouraged PCP follow-up as needed. Patient discharged in stable condition. CLINICAL IMPRESSION  1. Medical clearance for incarceration    2. Abrasion of multiple sites of right lower extremity, initial encounter        Blood pressure 102/75, pulse 80, temperature 97.8 °F (36.6 °C), temperature source Oral, resp. rate 16, weight 220 lb (99.8 kg), SpO2 96 %. DISPOSITION  Escobar Moreno was discharged to Police custody in stable condition. Patient was given scripts for the following medications. I counseled patient how to take these medications. New Prescriptions    No medications on file       Follow-up with:  Ariel Oakley DO  [address] [facility] [address] [phone]    Schedule an appointment as soon as possible for a visit       [facility] [place] [facility]  [address] [place] [address]  Go to   As needed, If symptoms worsen      DISCLAIMER: This chart was created using Dragon dictation software. Efforts were made by me to ensure accuracy, however some errors may be present due to limitations of this technology and occasionally words are not transcribed correctly.         France Solis MD  06/26/22 0897

## 2022-08-29 ASSESSMENT — ENCOUNTER SYMPTOMS
COUGH: 0
SHORTNESS OF BREATH: 0
RHINORRHEA: 0
DIARRHEA: 0
CONSTIPATION: 0

## 2022-08-30 NOTE — PROGRESS NOTES
[address] and [facility]                                             [address] [facility], [facility] [place], [address] [place] [id number]        Phone: [phone]                                     Name:  Molly Spence  :    1970      Consultants:   Patient Care Team:  Deandre Lucero DO as PCP - General (Family Medicine)    Chief Complaint:     Molly Spence is a 54 y.o. male  who presents today for a New Patient care visit with Personalized Prevention Plan Services as noted below. HPI:     Patient is a 73-year-old male with a past medical history of pericarditis who presents today for chronic care follow-up. Umbilical hernia  Patient states he noticed a hernia over the last year. He states that it is reducible. He states he is moving his bowels without issue. He states however that it does get stuck sometimes. He states it has never become red swollen or painful. Patient did follow-up with surgery however due to a recent incarceration he was unable to obtain that surgery. He would like to follow back up with general surgery to have this surgery completed. Testicular Mass  Patient states that while he was incarcerated in  or  he noticed his testicular mass. He states he was checked out by a doctor at this time but said that they would get no bigger. Recently the mass has become larger. He denies any trouble urinating or penile discharge. He states that it is not red or swollen. Due to his recent incarceration was unable to obtain the testicular ultrasound and would like to have this completed. Depression  Patient states that he has noticed his depression has worsened here recently. He denies any suicidal or homicidal ideation at this time. He is open to starting a medication. Erectile dysfunction  Patient states this is been occurring for quite some time.   He would like a medication to help with this.      Patient Active Problem List   Diagnosis    Umbilical hernia without obstruction and without gangrene           Past Medical History:    Past Medical History:   Diagnosis Date    Acute pancreatitis     Pericarditis        Past Surgical History:  No past surgical history on file. Home Meds:  Prior to Visit Medications    Medication Sig Taking?  Authorizing Provider   escitalopram (LEXAPRO) 10 MG tablet Take 1 tablet by mouth daily Yes Sendy Torres DO   sildenafil (VIAGRA) 100 MG tablet Take 1 tablet by mouth daily as needed for Erectile Dysfunction Yes Sendy Torres DO       Allergies:    Vicodin [hydrocodone-acetaminophen]    Family History:       Problem Relation Age of Onset    Cancer Mother     No Known Problems Father        Social History:  Social History       Tobacco History       Smoking Status  Current Every Day Smoker Smoking Frequency  0.5 packs/day Smoking Tobacco Type  Cigarettes      Smokeless Tobacco Use  Unknown              Alcohol History       Alcohol Use Status  Yes Comment  couple times week              Drug Use       Drug Use Status  Yes Types  Methamphetamines (Crystal Meth)              Sexual Activity       Sexually Active  Not Asked                       Health Maintenance Completed:  Health Maintenance   Topic Date Due    Colorectal Cancer Screen  Never done    Flu vaccine (1) 09/01/2022    Pneumococcal 0-64 years Vaccine (1 - PCV) 09/05/2022 (Originally 9[birth year]20/1978)    DTaP/Tdap/Td vaccine (6 - Tdap) 09/02/2023 (Originally 3/16/2017)    A1C test (Diabetic or Prediabetic)  05/25/2023    Depression Monitoring  09/02/2023    Lipids  05/25/2027    COVID-19 Vaccine  Completed    Hepatitis C screen  Completed    HIV screen  Completed    Hepatitis A vaccine  Aged Out    Hepatitis B vaccine  Aged Out    Hib vaccine  Aged Out    Meningococcal (ACWY) vaccine  Aged [employer] History   Administered Date(s) Administered    COVID-19, J&J, (age 18y+), IM, 0.5 mL 11/09/2021, 11/12/2021    COVID-19, MODERNA BLUE border, Primary or Immunocompromised, (age 12y+), IM, 100 mcg/0.5mL 09/10/2021, 10/10/2021    DTaP vaccine 01/09/2002, 03/07/2002, 05/16/2002, 03/19/2003, 03/16/2007    HPV Quadrivalent (Gardasil) 05/20/2013, 01/27/2014, 01/23/2015    Hep B/Hib (Comvax) 01/09/2002, 05/16/2002    Hepatitis A Ped/Adol (Havrix, Vaqta) 06/05/2012, 05/20/2013    Influenza A ([id number]) Vaccine PF IM 11/10/2009    Influenza Virus Vaccine 01/26/2007, 10/24/2011    Influenza, FLUARIX, FLULAVAL, FLUZONE (age 10 mo+) AND AFLURIA, (age 1 y+), PF, 0.5mL 09/14/2013, 11/05/2019    MMR 03/19/2003, 03/16/2007    Meningococcal B, Recombinant Sherryle Frames) 03/26/2018, 11/05/2019    Meningococcal MCV4P (Menactra) 05/20/2013, 03/26/2018    Pneumococcal Conjugate 7-valent ([place]) 01/09/2002, 03/07/2002, 05/16/2002    Polio IPV (IPOL) 01/09/2002, 03/07/2002, 05/16/2002, 03/16/2007    Tdap (Boostrix, Adacel) 05/20/2013    Varicella (Varivax) 02/24/2003, 03/16/2007         Review of Systems:  Review of Systems   Constitutional:  Negative for chills and fever. HENT:  Negative for congestion and rhinorrhea. Eyes:  Negative for visual disturbance. Respiratory:  Negative for cough and shortness of breath. Cardiovascular:  Negative for chest pain. Gastrointestinal:  Positive for abdominal pain. Negative for constipation and diarrhea. Genitourinary:  Positive for scrotal swelling and testicular pain. Negative for difficulty urinating and penile pain. Neurological:  Negative for headaches. Physical Exam:   Vitals:    09/02/22 1041   BP: 122/78   Site: Left Upper Arm   Position: Sitting   Cuff Size: Large Adult   Pulse: 78   Resp: 18   Temp: 97.9 °F (36.6 °C)   TempSrc: Infrared   SpO2: 97%   Weight: 214 lb 3.2 oz (97.2 kg)   Height: 6' 5\" (1.956 m)     Body mass index is 25.4 kg/m².     Wt Readings from Last 3 Encounters:   09/02/22 214 lb 3.2 oz (97.2 kg)   06/25/22 220 lb (99.8 kg)   05/31/22 205 lb (93 kg)       BP Readings from Last 3 Encounters:   09/02/22 122/78   06/25/22 102/75   05/31/22 133/71       Physical Exam  Constitutional:       Appearance: Normal appearance. He is normal weight. HENT:      Head: Normocephalic and atraumatic. Cardiovascular:      Rate and Rhythm: Normal rate and regular rhythm. Pulses: Normal pulses. Heart sounds: Normal heart sounds. No murmur heard. No friction rub. No gallop. Pulmonary:      Effort: Pulmonary effort is normal.      Breath sounds: Normal breath sounds. No wheezing, rhonchi or rales. Abdominal:      General: Abdomen is flat. Bowel sounds are normal. There is no distension. Palpations: Abdomen is soft. Tenderness: There is no abdominal tenderness. Hernia: A hernia (umbilical) is present. Musculoskeletal:         General: Normal range of motion. Skin:     General: Skin is warm and dry. Neurological:      General: No focal deficit present. Mental Status: He is alert and oriented to person, place, and time. Psychiatric:         Attention and Perception: Attention normal.         Mood and Affect: Mood is depressed. Affect is flat. Speech: Speech normal.         Behavior: Behavior normal. Behavior is cooperative. Thought Content: Thought content normal. Thought content does not include homicidal or suicidal ideation. Thought content does not include homicidal or suicidal plan. Lab Review:   No visits with results within 2 Month(s) from this visit.    Latest known visit with results is:   Hospital Outpatient Visit on 05/25/2022   Component Date Value    Hemoglobin A1C 05/25/2022 5.8     eAG 05/25/2022 119.8     Cholesterol, Total 05/25/2022 153     Triglycerides 05/25/2022 216 (A)    HDL 05/25/2022 57     LDL Calculated 05/25/2022 53     VLDL Cholesterol Calcula* 05/25/2022 43     Sodium 05/25/2022 142     Potassium 05/25/2022 4.4     Chloride 05/25/2022 101     CO2 05/25/2022 25     Anion Gap 05/25/2022 16     Glucose 05/25/2022 98     BUN 05/25/2022 10     Creatinine 05/25/2022 0.7 (A)    GFR Non- 05/25/2022 >60     GFR  05/25/2022 >60     Calcium 05/25/2022 9.4     Total Protein 05/25/2022 7.1     Albumin 05/25/2022 4.7     Albumin/Globulin Ratio 05/25/2022 2.0     Total Bilirubin 05/25/2022 0.3     Alkaline Phosphatase 05/25/2022 73     ALT 05/25/2022 17     AST 05/25/2022 17     TSH 05/25/2022 0.70     HIV Ag/Ab 05/25/2022 Non-Reactive     HIV-1 Antibody 05/25/2022 Non-Reactive     HIV ANTIGEN 05/25/2022 Non-Reactive     HIV-2 Ab 05/25/2022 Non-Reactive           Assessment/Plan:  Patient is a 53-year-old male with a past medical history of pericarditis who presents today for chronic care follow-up. 1. Depression, unspecified depression type  - Not at goal  - Will start patient on escitalopram 10 mg daily  - Patient was counseled on the side effects of the medication.  - Patient denies suicidal ideation at this time. However if he begins to have these feelings he is to present to the ER or call 911 immediately  - Patient will follow up in 1 month to assess for side effects and mood. 2. Erectile dysfunction, unspecified erectile dysfunction type  - Not at goal  - Patient will be started on sildenafil 100 mg. Patient was advised to try 50 mg at first.  If this improves his symptoms decrease the dose to 25 mg.    3. Umbilical hernia without obstruction and without gangrene  - Not at goal  - Patient was supplied with Dr. Violette Nails office number to reschedule his appointment. 4. Testicular mass  - Not at goal  - Order was read given to patient for testicular ultrasound.   - Patient will follow up in 1 month      Health Maintenance Due:  Health Maintenance Due   Topic Date Due    Colorectal Cancer Screen  Never done    Flu vaccine (1) 09/01/2022        Health care decision maker:  <72years old      Health Maintenance: (USPSTF Recommendations)  CRC/Colonoscopy Screening: (adults 45-49 (B), 50-75 (A)): Declined  Lung Ca Screening: Annual LDCT (+smoker age 49-80, smoked within 15 years, total of 20 pack yr history (B)): Will be due for next year  HIV Screen: (15-65 yr old, and all pregnant patients (A)): Negative  Hep C Screen: (18-79 yr old (B)): Negative  Immunizations:    RTC:  Return in about 1 month (around 10/2/2022) for follow up chronic conditions. EMR Dragon/transcription disclaimer:  Much of this encounter note is electronic transcription/translation of spoken language to printed texts. The electronic translation of spoken language may be erroneous, or at times, nonsensical words or phrases may be inadvertently transcribed.   Although I have reviewed the note for such errors, some may still exist.     Gerhard Love,    PGY-1   [facility] and [facility]

## 2022-09-02 ENCOUNTER — OFFICE VISIT (OUTPATIENT)
Dept: PRIMARY CARE CLINIC | Age: 52
End: 2022-09-02
Payer: COMMERCIAL

## 2022-09-02 VITALS
SYSTOLIC BLOOD PRESSURE: 122 MMHG | BODY MASS INDEX: 25.29 KG/M2 | TEMPERATURE: 97.9 F | RESPIRATION RATE: 18 BRPM | HEART RATE: 78 BPM | WEIGHT: 214.2 LBS | OXYGEN SATURATION: 97 % | DIASTOLIC BLOOD PRESSURE: 78 MMHG | HEIGHT: 77 IN

## 2022-09-02 DIAGNOSIS — N50.89 TESTICULAR MASS: ICD-10-CM

## 2022-09-02 DIAGNOSIS — F32.A DEPRESSION, UNSPECIFIED DEPRESSION TYPE: Primary | ICD-10-CM

## 2022-09-02 DIAGNOSIS — K42.9 UMBILICAL HERNIA WITHOUT OBSTRUCTION AND WITHOUT GANGRENE: ICD-10-CM

## 2022-09-02 DIAGNOSIS — N52.9 ERECTILE DYSFUNCTION, UNSPECIFIED ERECTILE DYSFUNCTION TYPE: ICD-10-CM

## 2022-09-02 PROCEDURE — 99214 OFFICE O/P EST MOD 30 MIN: CPT

## 2022-09-02 PROCEDURE — 4004F PT TOBACCO SCREEN RCVD TLK: CPT

## 2022-09-02 PROCEDURE — G8419 CALC BMI OUT NRM PARAM NOF/U: HCPCS

## 2022-09-02 PROCEDURE — G8427 DOCREV CUR MEDS BY ELIG CLIN: HCPCS

## 2022-09-02 RX ORDER — SILDENAFIL 100 MG/1
100 TABLET, FILM COATED ORAL DAILY PRN
Qty: 8 TABLET | Refills: 1 | Status: SHIPPED | OUTPATIENT
Start: 2022-09-02 | End: 2022-11-03 | Stop reason: SDUPTHER

## 2022-09-02 RX ORDER — ESCITALOPRAM OXALATE 10 MG/1
10 TABLET ORAL DAILY
Qty: 90 TABLET | Refills: 1 | Status: SHIPPED | OUTPATIENT
Start: 2022-09-02

## 2022-09-02 ASSESSMENT — PATIENT HEALTH QUESTIONNAIRE - PHQ9
2. FEELING DOWN, DEPRESSED OR HOPELESS: 1
9. THOUGHTS THAT YOU WOULD BE BETTER OFF DEAD, OR OF HURTING YOURSELF: 0
8. MOVING OR SPEAKING SO SLOWLY THAT OTHER PEOPLE COULD HAVE NOTICED. OR THE OPPOSITE, BEING SO FIGETY OR RESTLESS THAT YOU HAVE BEEN MOVING AROUND A LOT MORE THAN USUAL: 0
10. IF YOU CHECKED OFF ANY PROBLEMS, HOW DIFFICULT HAVE THESE PROBLEMS MADE IT FOR YOU TO DO YOUR WORK, TAKE CARE OF THINGS AT HOME, OR GET ALONG WITH OTHER PEOPLE: 1
6. FEELING BAD ABOUT YOURSELF - OR THAT YOU ARE A FAILURE OR HAVE LET YOURSELF OR YOUR FAMILY DOWN: 0
SUM OF ALL RESPONSES TO PHQ QUESTIONS 1-9: 7
SUM OF ALL RESPONSES TO PHQ9 QUESTIONS 1 & 2: 2
SUM OF ALL RESPONSES TO PHQ QUESTIONS 1-9: 7
7. TROUBLE CONCENTRATING ON THINGS, SUCH AS READING THE NEWSPAPER OR WATCHING TELEVISION: 1
SUM OF ALL RESPONSES TO PHQ QUESTIONS 1-9: 7
SUM OF ALL RESPONSES TO PHQ QUESTIONS 1-9: 7
3. TROUBLE FALLING OR STAYING ASLEEP: 2
5. POOR APPETITE OR OVEREATING: 1
1. LITTLE INTEREST OR PLEASURE IN DOING THINGS: 1
4. FEELING TIRED OR HAVING LITTLE ENERGY: 1

## 2022-09-02 ASSESSMENT — ANXIETY QUESTIONNAIRES
3. WORRYING TOO MUCH ABOUT DIFFERENT THINGS: 1
7. FEELING AFRAID AS IF SOMETHING AWFUL MIGHT HAPPEN: 1
6. BECOMING EASILY ANNOYED OR IRRITABLE: 1
IF YOU CHECKED OFF ANY PROBLEMS ON THIS QUESTIONNAIRE, HOW DIFFICULT HAVE THESE PROBLEMS MADE IT FOR YOU TO DO YOUR WORK, TAKE CARE OF THINGS AT HOME, OR GET ALONG WITH OTHER PEOPLE: SOMEWHAT DIFFICULT
1. FEELING NERVOUS, ANXIOUS, OR ON EDGE: 1
GAD7 TOTAL SCORE: 8
5. BEING SO RESTLESS THAT IT IS HARD TO SIT STILL: 1
4. TROUBLE RELAXING: 2
2. NOT BEING ABLE TO STOP OR CONTROL WORRYING: 1

## 2022-09-02 ASSESSMENT — ENCOUNTER SYMPTOMS: ABDOMINAL PAIN: 1

## 2022-09-19 ENCOUNTER — APPOINTMENT (OUTPATIENT)
Dept: GENERAL RADIOLOGY | Age: 52
End: 2022-09-19
Payer: COMMERCIAL

## 2022-09-19 ENCOUNTER — HOSPITAL ENCOUNTER (EMERGENCY)
Age: 52
Discharge: HOME | End: 2022-09-19
Attending: STUDENT IN AN ORGANIZED HEALTH CARE EDUCATION/TRAINING PROGRAM
Payer: COMMERCIAL

## 2022-09-19 VITALS
BODY MASS INDEX: 23.62 KG/M2 | HEART RATE: 84 BPM | TEMPERATURE: 98.9 F | RESPIRATION RATE: 17 BRPM | SYSTOLIC BLOOD PRESSURE: 148 MMHG | DIASTOLIC BLOOD PRESSURE: 82 MMHG | WEIGHT: 200 LBS | OXYGEN SATURATION: 97 % | HEIGHT: 77 IN

## 2022-09-19 DIAGNOSIS — S90.511A ABRASION, RIGHT ANKLE, INITIAL ENCOUNTER: ICD-10-CM

## 2022-09-19 DIAGNOSIS — S90.01XA CONTUSION OF RIGHT ANKLE, INITIAL ENCOUNTER: Primary | ICD-10-CM

## 2022-09-19 DIAGNOSIS — S80.11XA CONTUSION OF RIGHT LOWER EXTREMITY, INITIAL ENCOUNTER: ICD-10-CM

## 2022-09-19 PROCEDURE — 99283 EMERGENCY DEPT VISIT LOW MDM: CPT

## 2022-09-19 PROCEDURE — 73610 X-RAY EXAM OF ANKLE: CPT

## 2022-09-19 PROCEDURE — 73590 X-RAY EXAM OF LOWER LEG: CPT

## 2022-09-19 ASSESSMENT — PAIN SCALES - GENERAL: PAINLEVEL_OUTOF10: 6

## 2022-09-19 ASSESSMENT — PAIN - FUNCTIONAL ASSESSMENT: PAIN_FUNCTIONAL_ASSESSMENT: 0-10

## 2022-09-19 NOTE — ED PROVIDER NOTES
[address] [facility]  ED  EMERGENCY DEPARTMENT ENCOUNTER      Pt Name: Reece Feng  MRN: [id number]  [place] 1970  Date of evaluation: 9/19/2022  Provider: Davis Mathew MD    CHIEF COMPLAINT       Chief Complaint   Patient presents with    Leg Pain     [employer] after having a few drinks a few days ago and now both my calves and ankles hurt; patient reports R side hurts more than left; pain rated 6/10     Fall, R leg and ankle pain    HISTORY OF PRESENT ILLNESS   (Location/Symptom, Timing/Onset,Context/Setting, Quality, Duration, Modifying Factors, Severity)  Note limiting factors. Reece Feng is a 54 y.o. male who presents to the ED with a chief complaint of R leg and ankle pain after fall 2-3 days ago. Has been ambulatory and bearing weight without issue except R ankle pain, no change in UOP or color. Has abrasion to R lateral foot. Ankle pain moderate in severity, also states mild pain to the L leg but state not really bothering him there. Decrease ROM R ankle, knee pain localized to the R posterior knee. No sensory loss, no focal weakness. Tdap UTD. Denies IVDU. Symptoms not otherwise alleviated or exacerbated by other factors. NursingNotes were reviewed. REVIEW OF SYSTEMS    (2-9 systems for level 4, 10 or more for level 5)     Review of Systems   Constitutional:  Negative for chills and fever. HENT:  Negative for congestion and sore throat. Eyes:  Negative for pain and visual disturbance. Respiratory:  Negative for cough and shortness of breath. Cardiovascular:  Negative for chest pain and palpitations. Gastrointestinal:  Negative for abdominal pain, diarrhea, nausea and vomiting. Genitourinary:  Negative for dysuria and frequency. Musculoskeletal:  Positive for arthralgias. Negative for back pain and neck pain. Skin:  Positive for wound. Negative for rash. Neurological:  Negative for dizziness, weakness and light-headedness.       PAST MEDICAL HISTORY     Past Medical History:   Diagnosis Date    Acute pancreatitis     Pericarditis          SURGICALHISTORY     History reviewed. No pertinent surgical history. CURRENT MEDICATIONS       Discharge Medication List as of 9/19/2022  2:52 AM        CONTINUE these medications which have NOT CHANGED    Details   escitalopram (LEXAPRO) 10 MG tablet Take 1 tablet by mouth daily, Disp-90 tablet, R-1Normal      sildenafil (VIAGRA) 100 MG tablet Take 1 tablet by mouth daily as needed for Erectile Dysfunction, Disp-8 tablet, R-1Normal             ALLERGIES     Vicodin [hydrocodone-acetaminophen]    FAMILY HISTORY       Family History   Problem Relation Age of Onset    Cancer Mother     No Known Problems Father           SOCIAL HISTORY       Social History     Socioeconomic History    Marital status:      Spouse name: None    Number of children: None    Years of education: None    Highest education level: None   Tobacco Use    Smoking status: Every Day     Packs/day: 0.50     Types: Cigarettes    Smokeless tobacco: Never   Vaping Use    Vaping Use: Never used   Substance and Sexual Activity    Alcohol use: Yes     Comment: couple times week    Drug use: Yes     Types: Methamphetamines (Crystal Meth)     Comment: 2-3 times per week    Sexual activity: Yes     Partners: Female       SCREENINGS    [place] Coma Scale  Eye Opening: Spontaneous  Best Verbal Response: Oriented  Best Motor Response: Obeys commands  [place] Coma Scale Score: 15        PHYSICAL EXAM    (up to 7 for level 4, 8 or more for level 5)     ED Triage Vitals [09/19/22 0123]   BP Temp Temp Source Heart Rate Resp SpO2 Height Weight   (!) 151/88 98.1 °F (36.7 °C) Oral 85 17 98 % 6' 5\" (1.956 m) 200 lb (90.7 kg)       General: Alert and oriented appropriately for age, No acute distress. Eye: Normal conjunctiva. Sclera anicteric. HENT: Oral mucosa is moist.  Respiratory: Respirations even and non-labored.   Cardiovascular: Normal rate, Regular rhythm. Gastrointestinal: Soft, Non-tender, Non-distended. : deferred. Musculoskeletal: No swelling. RLE: Ttp R medial malleolus and posterior malleolus, ttp posterior proximal leg. Ranges ankle and knee completely otherwise, joints stable. No ligamentous laxity. Integumentary: Warm, Dry. Abrasion to the R lateral and medial ankle. Neurologic: Alert and appropriate for age. Sensation and strength intact bilateral lower extremities. No focal deficits. Psychiatric: Cooperative. DIAGNOSTIC RESULTS       RADIOLOGY:   Non-plain filmimages such as CT, Ultrasound and MRI are read by the radiologist. Plain radiographic images are visualized and preliminarily interpreted by the emergency physician with the below findings:      Interpretation per the Radiologist below, if available at the time ofthis note:    XR TIBIA FIBULA RIGHT (2 VIEWS)   Final Result   No acute findings RECOMMENDATION:         XR ANKLE RIGHT (MIN 3 VIEWS)   Final Result   No acute findings. RECOMMENDATION:               EMERGENCY DEPARTMENT COURSE and DIFFERENTIAL DIAGNOSIS/MDM:   Vitals:    Vitals:    22 0123 22 0245   BP: (!) 151/88 (!) 148/82   Pulse: 85 84   Resp: 17 17   Temp: 98.1 °F (36.7 °C) 98.9 °F (37.2 °C)   TempSrc: Oral    SpO2: 98% 97%   Weight: 200 lb (90.7 kg)    Height: 6' 5\" (1.956 m)          Medical decision makin yo M p/w R leg and ankle pain after fall. No instability, NVI, abrasion to the leg, tdap UTD, otherwise closed injury. HDS. No e/o infection clinically. Likely contusion, sprain. Offered and declined antiinflammatory medication. Pt states wants to make sure ankle isn't broken. XR obtained, negative acute, no [place] either. Ambulatory, rec MMPC with OTC meds. Given d/c instructions and return precautions, pt voices understanding. D/c home, ambulated steadily from the ED. FINAL IMPRESSION      1. Contusion of right ankle, initial encounter    2.  Contusion of right lower extremity, initial encounter    3.  Abrasion, right ankle, initial encounter          DISPOSITION/PLAN   DISPOSITION Decision To Discharge 09/19/2022 02:44:17 AM      PATIENT REFERRED TO:  Idalia Duque DO  [address]  [phone]    In 2 days      [facility]  ED  [address] [address]  [phone]    If symptoms worsen      DISCHARGE MEDICATIONS:  Discharge Medication List as of 9/19/2022  2:52 AM             (Please note that portions of this note were completed with a voice recognition program.Efforts were made to edit the dictations but occasionally words are mis-transcribed.)    Tamara De La Fuente MD (electronically signed)  Attending Emergency Physician          Tamara De La Fuente MD  09/20/22 6949

## 2022-09-19 NOTE — ED NOTES
Discharge instructions explained by ED provider. Patient verbalized understanding and denies any other concerns or complaints at this time. Patient vital signs stable and no acute signs or symptoms of distress noted at discharge. Patient deemed clinically stable. Patient d/c home.      Isael Carr RN  09/19/22 5120

## 2022-09-19 NOTE — Clinical Note
Liberty Aponte was seen and treated in our emergency department on 9/19/2022. He may return to work on 09/20/2022. If you have any questions or concerns, please don't hesitate to call.       Viviane Quinonez MD

## 2022-09-20 ASSESSMENT — ENCOUNTER SYMPTOMS
NAUSEA: 0
SHORTNESS OF BREATH: 0
COUGH: 0
ABDOMINAL PAIN: 0
VOMITING: 0
EYE PAIN: 0
DIARRHEA: 0
BACK PAIN: 0
SORE THROAT: 0

## 2022-11-03 DIAGNOSIS — N52.9 ERECTILE DYSFUNCTION, UNSPECIFIED ERECTILE DYSFUNCTION TYPE: ICD-10-CM

## 2022-11-03 RX ORDER — SILDENAFIL 100 MG/1
100 TABLET, FILM COATED ORAL DAILY PRN
Qty: 6 TABLET | Refills: 2 | Status: SHIPPED | OUTPATIENT
Start: 2022-11-03

## 2022-11-03 NOTE — TELEPHONE ENCOUNTER
Refill Request       Last Seen: Last Seen Department: 9/2/2022  Last Seen by PCP: 9/2/2022    Last Written: 9/2/22 #8 with 1     Next Appointment:   No future appointments.           Requested Prescriptions     Pending Prescriptions Disp Refills    sildenafil (VIAGRA) 100 MG tablet 6 tablet 2     Sig: Take 1 tablet by mouth daily as needed for Erectile Dysfunction

## 2022-12-06 DIAGNOSIS — N52.9 ERECTILE DYSFUNCTION, UNSPECIFIED ERECTILE DYSFUNCTION TYPE: ICD-10-CM

## 2022-12-06 RX ORDER — SILDENAFIL 100 MG/1
100 TABLET, FILM COATED ORAL DAILY PRN
Qty: 6 TABLET | Refills: 2 | Status: SHIPPED | OUTPATIENT
Start: 2022-12-06

## 2022-12-06 NOTE — TELEPHONE ENCOUNTER
Refill Request       Last Seen: Last Seen Department: 9/2/2022  Last Seen by PCP: 9/2/2022    Last Written: 11/03/2022    Next Appointment:   No future appointments.           Requested Prescriptions     Pending Prescriptions Disp Refills    sildenafil (VIAGRA) 100 MG tablet 6 tablet 2     Sig: Take 1 tablet by mouth daily as needed for Erectile Dysfunction

## 2023-02-07 ENCOUNTER — HOSPITAL ENCOUNTER (EMERGENCY)
Age: 53
Discharge: HOME | End: 2023-02-07
Attending: EMERGENCY MEDICINE
Payer: COMMERCIAL

## 2023-02-07 ENCOUNTER — HOSPITAL ENCOUNTER (EMERGENCY)
Age: 53
Discharge: HOME | End: 2023-02-08
Attending: EMERGENCY MEDICINE
Payer: COMMERCIAL

## 2023-02-07 ENCOUNTER — APPOINTMENT (OUTPATIENT)
Dept: GENERAL RADIOLOGY | Age: 53
End: 2023-02-07
Payer: COMMERCIAL

## 2023-02-07 VITALS
OXYGEN SATURATION: 100 % | DIASTOLIC BLOOD PRESSURE: 83 MMHG | WEIGHT: 210 LBS | HEART RATE: 78 BPM | SYSTOLIC BLOOD PRESSURE: 131 MMHG | TEMPERATURE: 98.4 F | BODY MASS INDEX: 24.79 KG/M2 | HEIGHT: 77 IN | RESPIRATION RATE: 18 BRPM

## 2023-02-07 VITALS
HEART RATE: 82 BPM | OXYGEN SATURATION: 98 % | DIASTOLIC BLOOD PRESSURE: 92 MMHG | RESPIRATION RATE: 18 BRPM | SYSTOLIC BLOOD PRESSURE: 152 MMHG | TEMPERATURE: 97 F
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
## 2023-02-07 DIAGNOSIS — H11.32 SUBCONJUNCTIVAL HEMORRHAGE OF LEFT EYE: Primary | ICD-10-CM

## 2023-02-07 DIAGNOSIS — K42.9 UMBILICAL HERNIA WITHOUT OBSTRUCTION AND WITHOUT GANGRENE: ICD-10-CM

## 2023-02-07 DIAGNOSIS — F19.10 POLYSUBSTANCE ABUSE (HCC): ICD-10-CM

## 2023-02-07 DIAGNOSIS — R51.9 NONINTRACTABLE HEADACHE, UNSPECIFIED CHRONICITY PATTERN, UNSPECIFIED HEADACHE TYPE: Primary | ICD-10-CM

## 2023-02-07 LAB
A/G RATIO: 1.3 (ref 1.1–2.2)
A/G RATIO: 1.4 (ref 1.1–2.2)
ALBUMIN SERPL-MCNC: 4.3 G/DL (ref 3.4–5)
ALBUMIN SERPL-MCNC: 4.4 G/DL (ref 3.4–5)
ALP BLD-CCNC: 72 U/L (ref 40–129)
ALP BLD-CCNC: 82 U/L (ref 40–129)
ALT SERPL-CCNC: 48 U/L (ref 10–40)
ALT SERPL-CCNC: 48 U/L (ref 10–40)
ANION GAP SERPL CALCULATED.3IONS-SCNC: 13 MMOL/L (ref 3–16)
ANION GAP SERPL CALCULATED.3IONS-SCNC: 8 MMOL/L (ref 3–16)
AST SERPL-CCNC: 73 U/L (ref 15–37)
AST SERPL-CCNC: 96 U/L (ref 15–37)
BASOPHILS ABSOLUTE: 0 K/UL (ref 0–0.2)
BASOPHILS RELATIVE PERCENT: 0.4 %
BILIRUB SERPL-MCNC: 0.8 MG/DL (ref 0–1)
BILIRUB SERPL-MCNC: 0.9 MG/DL (ref 0–1)
BUN BLDV-MCNC: 12 MG/DL (ref 7–20)
BUN BLDV-MCNC: 13 MG/DL (ref 7–20)
CALCIUM SERPL-MCNC: 9.2 MG/DL (ref 8.3–10.6)
CALCIUM SERPL-MCNC: 9.3 MG/DL (ref 8.3–10.6)
CHLORIDE BLD-SCNC: 96 MMOL/L (ref 99–110)
CHLORIDE BLD-SCNC: 97 MMOL/L (ref 99–110)
CO2: 25 MMOL/L (ref 21–32)
CO2: 27 MMOL/L (ref 21–32)
CREAT SERPL-MCNC: 0.8 MG/DL (ref 0.9–1.3)
CREAT SERPL-MCNC: 0.8 MG/DL (ref 0.9–1.3)
EKG ATRIAL RATE: 77 BPM
EKG DIAGNOSIS: NORMAL
EKG P AXIS: 50 DEGREES
EKG P-R INTERVAL: 160 MS
EKG Q-T INTERVAL: 380 MS
EKG QRS DURATION: 82 MS
EKG QTC CALCULATION (BAZETT): 430 MS
EKG R AXIS: 47 DEGREES
EKG T AXIS: 23 DEGREES
EKG VENTRICULAR RATE: 77 BPM
EOSINOPHILS ABSOLUTE: 0.1 K/UL (ref 0–0.6)
EOSINOPHILS RELATIVE PERCENT: 1.2 %
ETHANOL: 23 MG/DL (ref 0–0.08)
GFR SERPL CREATININE-BSD FRML MDRD: >60 ML/MIN/{1.73_M2}
GFR SERPL CREATININE-BSD FRML MDRD: >60 ML/MIN/{1.73_M2}
GLUCOSE BLD-MCNC: 119 MG/DL (ref 70–99)
GLUCOSE BLD-MCNC: 140 MG/DL (ref 70–99)
HCT VFR BLD CALC: 40.9 % (ref 40.5–52.5)
HEMOGLOBIN: 13.6 G/DL (ref 13.5–17.5)
LYMPHOCYTES ABSOLUTE: 1.4 K/UL (ref 1–5.1)
LYMPHOCYTES RELATIVE PERCENT: 23 %
MCH RBC QN AUTO: 28.9 PG (ref 26–34)
MCHC RBC AUTO-ENTMCNC: 33.2 G/DL (ref 31–36)
MCV RBC AUTO: 86.8 FL (ref 80–100)
MONOCYTES ABSOLUTE: 0.5 K/UL (ref 0–1.3)
MONOCYTES RELATIVE PERCENT: 8.6 %
NEUTROPHILS ABSOLUTE: 3.9 K/UL (ref 1.7–7.7)
NEUTROPHILS RELATIVE PERCENT: 66.8 %
PDW BLD-RTO: 14.8 % (ref 12.4–15.4)
PLATELET # BLD: 150 K/UL (ref 135–450)
PMV BLD AUTO: 9.6 FL (ref 5–10.5)
POTASSIUM REFLEX MAGNESIUM: 3.7 MMOL/L (ref 3.5–5.1)
POTASSIUM REFLEX MAGNESIUM: 5.8 MMOL/L (ref 3.5–5.1)
RBC # BLD: 4.71 M/UL (ref 4.2–5.9)
SODIUM BLD-SCNC: 131 MMOL/L (ref 136–145)
SODIUM BLD-SCNC: 135 MMOL/L (ref 136–145)
TOTAL PROTEIN: 7.5 G/DL (ref 6.4–8.2)
TOTAL PROTEIN: 7.5 G/DL (ref 6.4–8.2)
TROPONIN: <0.01 NG/ML
WBC # BLD: 5.9 K/UL (ref 4–11)

## 2023-02-07 PROCEDURE — 84484 ASSAY OF TROPONIN QUANT: CPT

## 2023-02-07 PROCEDURE — 93005 ELECTROCARDIOGRAM TRACING: CPT | Performed by: EMERGENCY MEDICINE

## 2023-02-07 PROCEDURE — 71046 X-RAY EXAM CHEST 2 VIEWS: CPT

## 2023-02-07 PROCEDURE — 82077 ASSAY SPEC XCP UR&BREATH IA: CPT

## 2023-02-07 PROCEDURE — 36415 COLL VENOUS BLD VENIPUNCTURE: CPT

## 2023-02-07 PROCEDURE — 99284 EMERGENCY DEPT VISIT MOD MDM: CPT

## 2023-02-07 PROCEDURE — 99285 EMERGENCY DEPT VISIT HI MDM: CPT

## 2023-02-07 PROCEDURE — 85025 COMPLETE CBC W/AUTO DIFF WBC: CPT

## 2023-02-07 PROCEDURE — 93010 ELECTROCARDIOGRAM REPORT: CPT | Performed by: INTERNAL MEDICINE

## 2023-02-07 PROCEDURE — 80053 COMPREHEN METABOLIC PANEL: CPT

## 2023-02-07 RX ORDER — ACETAMINOPHEN 500 MG
500 TABLET ORAL ONCE
Status: DISCONTINUED | OUTPATIENT
Start: 2023-02-07 | End: 2023-02-08 | Stop reason: HOSPADM

## 2023-02-07 ASSESSMENT — HEART SCORE: ECG: 0

## 2023-02-07 ASSESSMENT — PAIN - FUNCTIONAL ASSESSMENT
PAIN_FUNCTIONAL_ASSESSMENT: NONE - DENIES PAIN
PAIN_FUNCTIONAL_ASSESSMENT: 0-10

## 2023-02-07 ASSESSMENT — VISUAL ACUITY
OS: 20/30
OD: 20/15

## 2023-02-07 ASSESSMENT — PAIN SCALES - GENERAL: PAINLEVEL_OUTOF10: 5

## 2023-02-07 ASSESSMENT — PAIN DESCRIPTION - PAIN TYPE: TYPE: ACUTE PAIN

## 2023-02-07 ASSESSMENT — PAIN DESCRIPTION - LOCATION: LOCATION: ABDOMEN

## 2023-02-07 ASSESSMENT — PAIN DESCRIPTION - FREQUENCY: FREQUENCY: CONTINUOUS

## 2023-02-07 NOTE — ED PROVIDER NOTES
[address] ED    CHIEF COMPLAINT  Other (Started coughing bad tonight with left arm pain/tingling sensation. States whenever he cough something popping in his head and chest. Had 3 shots of whiskey tonight also took 4 tabs of advil 200mg.)       HISTORY OF PRESENT ILLNESS  Steve Tobar is a 50 y.o. male who presents to the ED for evaluation of chest pain and left eye redness. The patient presents by EMS. He was found walking down the road. He apparently was at a friend's house drinking whiskey. He laid down and when he awoke he coughed. Thereafter he felt a popping sensation in his left chest and also some redness of the left eye. The chest pain has resolved. Denies fever, nausea, vomiting, diarrhea, abdominal pain. He admits to what he believes to be methamphetamine use over the weekend. I have reviewed the following from the nursing documentation:    Past Medical History:   Diagnosis Date    Acute pancreatitis     Pericarditis      No past surgical history on file.   Family History   Problem Relation Age of Onset    Cancer Mother     No Known Problems Father      Social History     Socioeconomic History    Marital status:      Spouse name: Not on file    Number of children: Not on file    Years of education: Not on file    Highest education level: Not on file   Occupational History    Not on file   Tobacco Use    Smoking status: Every Day     Packs/day: 0.50     Types: Cigarettes    Smokeless tobacco: Never   Vaping Use    Vaping Use: Never used   Substance and Sexual Activity    Alcohol use: Yes     Comment: couple times week    Drug use: Yes     Types: Methamphetamines (Crystal Meth)     Comment: 2-3 times per week    Sexual activity: Yes     Partners: Female   Other Topics Concern    Not on file   Social History Narrative    Not on file     Social Determinants of Health     Financial Resource Strain: Not on file   Food Insecurity: Not on file   Transportation Needs: Not on file Physical Activity: Not on file   Stress: Not on file   Social Connections: Not on file   Intimate Partner Violence: Not on file   Housing Stability: Not on file     No current facility-administered medications for this encounter. Current Outpatient Medications   Medication Sig Dispense Refill    sildenafil (VIAGRA) 100 MG tablet Take 1 tablet by mouth daily as needed for Erectile Dysfunction (Patient not taking: Reported on 2/7/2023) 6 tablet 2    escitalopram (LEXAPRO) 10 MG tablet Take 1 tablet by mouth daily (Patient not taking: Reported on 2/7/2023) 90 tablet 1     Allergies   Allergen Reactions    Vicodin [Hydrocodone-Acetaminophen] Nausea And Vomiting         PHYSICAL EXAM  ED Triage Vitals [02/07/23 0024]   Enc Vitals Group      BP (!) 160/66      Heart Rate 78      Resp 18      Temp 98.4 °F (36.9 °C)      Temp Source Oral      SpO2 99 %      Weight 210 lb (95.3 kg)      Height 6' 5\" (1.956 m)      Head Circumference       Peak Flow       Pain Score       Pain Loc       Pain Edu? Excl. in [address]? General appearance: Awake and alert. Cooperative. No acute distress. HEENT: Normocephalic. Atraumatic. Mucous membranes are moist.  There is left subconjunctival hemorrhage. No hyphema, hypopyon, or proptosis. Neck: No C spine TTP, step off, or gross deformity. Heart/Chest: RRR. No murmurs. Lungs: Respirations unlabored. CTAB. Good air exchange. Speaking comfortably in full sentences. Abdomen: Soft. Non-tender. Non-distended. No rebound or guarding. Musculoskeletal: No extremity edema. No deformity. Skin: Warm and dry. No acute rashes. Neurological: Alert and oriented. CN II-XII intact. Strength 5/5 bilateral upper and lower extremities. Sensation intact to light touch. Gait normal.      LABS  I have reviewed all labs for this visit.    Results for orders placed or performed during the hospital encounter of 02/07/23   CBC with Auto Differential   Result Value Ref Range    WBC 5.9 4.0 - 11.0 K/uL    RBC 4.71 4.20 - 5.90 M/uL    Hemoglobin 13.6 13.5 - 17.5 g/dL    Hematocrit 40.9 40.5 - 52.5 %    MCV 86.8 80.0 - 100.0 fL    MCH 28.9 26.0 - 34.0 pg    MCHC 33.2 31.0 - 36.0 g/dL    RDW 14.8 12.4 - 15.4 %    Platelets 622 118 - 717 K/uL    MPV 9.6 5.0 - 10.5 fL    Neutrophils % 66.8 %    Lymphocytes % 23.0 %    Monocytes % 8.6 %    Eosinophils % 1.2 %    Basophils % 0.4 %    Neutrophils Absolute 3.9 1.7 - 7.7 K/uL    Lymphocytes Absolute 1.4 1.0 - 5.1 K/uL    Monocytes Absolute 0.5 0.0 - 1.3 K/uL    Eosinophils Absolute 0.1 0.0 - 0.6 K/uL    Basophils Absolute 0.0 0.0 - 0.2 K/uL   Comprehensive Metabolic Panel w/ Reflex to MG   Result Value Ref Range    Sodium 131 (L) 136 - 145 mmol/L    Potassium reflex Magnesium 5.8 (H) 3.5 - 5.1 mmol/L    Chloride 96 (L) 99 - 110 mmol/L    CO2 27 21 - 32 mmol/L    Anion Gap 8 3 - 16    Glucose 140 (H) 70 - 99 mg/dL    BUN 12 7 - 20 mg/dL    Creatinine 0.8 (L) 0.9 - 1.3 mg/dL    Est, Glom Filt Rate >60 >60    Calcium 9.2 8.3 - 10.6 mg/dL    Total Protein 7.5 6.4 - 8.2 g/dL    Albumin 4.3 3.4 - 5.0 g/dL    Albumin/Globulin Ratio 1.3 1.1 - 2.2    Total Bilirubin 0.9 0.0 - 1.0 mg/dL    Alkaline Phosphatase 72 40 - 129 U/L    ALT 48 (H) 10 - 40 U/L    AST 96 (H) 15 - 37 U/L   Troponin   Result Value Ref Range    Troponin <0.01 <0.01 ng/mL   Ethanol   Result Value Ref Range    Ethanol Lvl 23 mg/dL   Comprehensive Metabolic Panel w/ Reflex to MG   Result Value Ref Range    Sodium 135 (L) 136 - 145 mmol/L    Potassium reflex Magnesium 3.7 3.5 - 5.1 mmol/L    Chloride 97 (L) 99 - 110 mmol/L    CO2 25 21 - 32 mmol/L    Anion Gap 13 3 - 16    Glucose 119 (H) 70 - 99 mg/dL    BUN 13 7 - 20 mg/dL    Creatinine 0.8 (L) 0.9 - 1.3 mg/dL    Est, Glom Filt Rate >60 >60    Calcium 9.3 8.3 - 10.6 mg/dL    Total Protein 7.5 6.4 - 8.2 g/dL    Albumin 4.4 3.4 - 5.0 g/dL    Albumin/Globulin Ratio 1.4 1.1 - 2.2    Total Bilirubin 0.8 0.0 - 1.0 mg/dL    Alkaline Phosphatase 82 40 - 129 U/L ALT 48 (H) 10 - 40 U/L    AST 73 (H) 15 - 37 U/L         RADIOLOGY  I have reviewed all radiographic studies for this visit. XR CHEST (2 VW)   Final Result   No acute process. ECG  EKG interpreted by myself. Rate: normal  Rhythm: NSR  Axis: normal  Intervals: within normal limits  ST Segments: no acute abnormality  T waves: no acute abnormality  Comparison: Compared to 5/17/22, there is no significant change  Impression: NSR with no acute abnormality     ED COURSE/MDM    50 y.o. male presents with complaint of transient left chest pain and left eye pain after coughing. The patient is afebrile and nontoxic in appearance with otherwise reassuring vital signs. His exam is notable for a left subconjunctival hemorrhage, likely secondary to coughing. His visual acuities are reassuring. With respect to the patient's complaint of chest pain, the symptoms have improved. His EKG shows no acute ischemic abnormality to my independent interpretation. Troponin is within normal limits. Low suspicion for ACS. LFTs are notable for mild transaminitis in a pattern of ethanol abuse. EtOH is 23. Initial renal panel is hemolyzed. Repeat study shows no significant electrolyte abnormality. His chest x-ray shows no acute cardiopulmonary process to my independent interpretation. No evidence of pneumothorax or pneumonia. I considered CTPA to evaluate for pulmonary embolism however in the absence of pleuritic chest pain, tachycardia, tachypnea, hypoxia, or clinical signs/symptoms of DVT I am not concerned for pulmonary embolism and thinks that the risk of radiation exposure would outweigh any potential diagnostic benefit. There is no history of forceful vomiting and therefore I am not concerned for esophageal rupture. There is no migration of the chest pain or tearing quality of the chest pain and therefore I am not concerned for aortic dissection.  HEART score calculates to low risk therefore will discharge with close f/u with PCP in several days. F/u with ophtho PRN. Usual strict return precautions for new or worsening symptoms communicated. History: 0  EC  Patient Age: 3  Risk Factors: 1  Troponin: 0  Heart Score Total: 2     Is this patient to be included in the SEP-1 Core Measure? No   Exclusion criteria - the patient is NOT to be included for SEP-1 Core Measure due to: Infection is not suspected    I, Margarita Sage MD, am the primary clinician of record. During the patient's ED course, the patient was given:  Medications - No data to display     Patient was given prescriptions for the following medications. I counseled the patient as to how to take these medications. Discharge Medication List as of 2023  2:16 AM          Patient instructed to follow up with:   Stephy Chaves, DO  [address] [address]  [phone]    In 2 days      [facility] ED  [address] [facility]  [phone]    As needed, if symptoms worsen    [address] [facility]    In 1 week  As needed    [facility] ED  [address] [facility]  [phone]    As needed, if symptoms worsen    All questions were answered and the patient/family expressed understanding and agreement with the plan. PROCEDURES  None    CRITICAL CARE  N/A    CLINICAL IMPRESSION  1. Subconjunctival hemorrhage of left eye    2. Polysubstance abuse [facility])        DISPOSITION  Decision To Discharge 2023 02:11:12 AM     Margarita Sage MD    Note: This chart was created using voice recognition dictation software. Efforts were made by me to ensure accuracy, however some errors may be present due to limitations of this technology and occasionally words are not transcribed correctly.         Margarita Sage MD  23 2131

## 2023-02-08 ENCOUNTER — APPOINTMENT (OUTPATIENT)
Dept: CT IMAGING | Age: 53
End: 2023-02-08
Payer: COMMERCIAL

## 2023-02-08 PROCEDURE — 70450 CT HEAD/BRAIN W/O DYE: CPT

## 2023-02-08 RX ORDER — LORAZEPAM 1 MG/1
1 TABLET ORAL ONCE
Status: DISCONTINUED | OUTPATIENT
Start: 2023-02-08 | End: 2023-02-08 | Stop reason: HOSPADM

## 2023-02-08 NOTE — ED NOTES
Pt came into triage room; Rn with another pt; stating he needed to talk; Rn stepped out of room, pt stating the people next to him are recording him and he did not feel safe; pt pointing and yelling at other pts; pt asked to come to another area; pt taken to centeno chair; stood up and aggressively telling RN he is calling 911; RN called out for help on radio, then pt called 911 stating he did not feel safe; pt went into restroom.   Provider notifed      Sherri Echeverria RN  02/08/23 [id number]

## 2023-02-08 NOTE — ED NOTES
Pt is agitated and paranoid walking through the ER. Pt had called 911. CCSO on scene to talk to pt. Pt discharged at this time. Pt escorted to the lobby with security and myself. Pt attempting to talk to other pts and family. Pt instructed to sit in the lobby quietly or the  would be called back.       Sylvester Smith RN  02/08/23 9465

## 2023-02-08 NOTE — ED PROVIDER NOTES
[address] [facility]  EMERGENCY DEPARTMENT ENCOUNTER      Pt Name: Azeem Sidhu  MRN: [id number]  [place] 1970  Date of evaluation: 2/7/2023  Provider: Romaine Bai MD    CHIEF COMPLAINT       Chief Complaint   Patient presents with    Abdominal Pain     Pt states he vomited x1; abdominal hernia popped out; c/o pain 10/10 pt states he pushed it back in, pain is better          HISTORY OF PRESENT ILLNESS   (Location/Symptom, Timing/Onset, Context/Setting, Quality, Duration, Modifying Factors, Severity)  Note limiting factors. Azeem Sidhu is a 50 y.o. male with past medical history of prior pancreatitis, pericarditis, polysubstance abuse here today with headache. Patient states that he was seen in the emergency department last night. Notes that he had been drinking alcohol and got drunk. He was wandering around in the road and was picked up by EMS. He notes that while he was going to get to the stretcher he fell and hit his head. He was seen in the emergency department was found to have a left subconjunctival hemorrhage and left arm injury was ultimately discharged home. He states that he has been having a throbbing aching left lateral headache where he hit his head on the ground. He states they did not do a CAT scan of his head and he just wanted to come get checked out. He notes because of the headache he had 1 episode of vomiting and after vomiting his chronic umbilical hernia enlarged and became quite tender. He was able to push it back in and is having no pain at this time. He is otherwise been eating and drinking normally. Normal bowel movements. HPI    Nursing Notes were reviewed. REVIEW OF SYSTEMS    (2-9 systems for level 4, 10 or more for level 5)     Review of Systems    Please see HPI for pertinent positive and negative review of system findings. A full 10 system ROS was performed and otherwise negative.         PAST MEDICAL HISTORY     Past Medical History:   Diagnosis Date    Acute pancreatitis     Pericarditis          SURGICAL HISTORY     History reviewed. No pertinent surgical history. CURRENT MEDICATIONS       Previous Medications    ESCITALOPRAM (LEXAPRO) 10 MG TABLET    Take 1 tablet by mouth daily    SILDENAFIL (VIAGRA) 100 MG TABLET    Take 1 tablet by mouth daily as needed for Erectile Dysfunction       ALLERGIES     Vicodin [hydrocodone-acetaminophen]    FAMILY HISTORY       Family History   Problem Relation Age of Onset    Cancer Mother     No Known Problems Father           SOCIAL HISTORY       Social History     Socioeconomic History    Marital status:      Spouse name: None    Number of children: None    Years of education: None    Highest education level: None   Tobacco Use    Smoking status: Every Day     Packs/day: 0.50     Types: Cigarettes    Smokeless tobacco: Never   Vaping Use    Vaping Use: Never used   Substance and Sexual Activity    Alcohol use: Yes     Comment: couple times week: last drink 5 days ago    Drug use: Yes     Types: Methamphetamines (Crystal Meth)     Comment: 2-3 times per week    Sexual activity: Yes     Partners: Female       SCREENINGS    [place] Coma Scale  Eye Opening: Spontaneous  Best Verbal Response: Oriented  Best Motor Response: Obeys commands  [place] Coma Scale Score: 15          PHYSICAL EXAM    (up to 7 for level 4, 8 or more for level 5)     ED Triage Vitals [02/07/23 2320]   BP Temp Temp Source Heart Rate Resp SpO2 Height Weight   (!) 152/92 97 °F (36.1 °C) Oral 82 18 98 % -- --       Physical Exam    General appearance:  Cooperative. No acute distress. Skin:  Warm. Dry. Eye:  Extraocular movements intact. Pupils are equally round and reactive to light and accommodation. CN II-XII intact. Left eye inferior lateral subconjunctival hematoma  Ears, nose, mouth and throat:  Oral mucosa moist,  Neck:  Trachea midline.    Heart:  Regular rate and rhythm  Perfusion:  intact  Respiratory:  Lungs clear to auscultation bilaterally. Respirations nonlabored. Abdominal:   Non distended. Nontender abdomen with easily reproducible umbilical hernia  Neurological:  Alert and oriented x 3. Moves all extremities spontaneously. Intact sensation throughout. Intact finger-to-nose bilaterally. No drift in the upper or lower extremities. Gait normal.  2+ bilateral patellar reflexes  Musculoskeletal:   Normal ROM, no deformities          Psychiatric:  Normal mood        DIAGNOSTIC RESULTS       Labs Reviewed - No data to display    Interpretation per the Radiologist below, if obtained/available at the time of this note:    CT Head W/O Contrast   Final Result   No acute abnormality seen which is unchanged from the prior study. All other labs/imaging were within normal range or not returned as of this dictation. EMERGENCY DEPARTMENT COURSE and DIFFERENTIAL DIAGNOSIS/MDM:   Vitals:    Vitals:    02/07/23 2320   BP: (!) 152/92   Pulse: 82   Resp: 18   Temp: 97 °F (36.1 °C)   TempSrc: Oral   SpO2: 98%           Differential Diagnosis: Subdural hematoma, intracranial hemorrhage, concussion,    Patient presents emergency department today complaining of a headache after falling and hitting his head yesterday. Was seen in the emergency department was found to have a left subconjunctival hematoma but did not have any imaging of his head. States he vomited once today which caused his hernia to become larger. He was having pain in his hernia site in his umbilicus earlier but is since reduced and has no pain at present. Easily reproducible at this time. Normal neurologic exam.  Still has a mild subconjunctival hemorrhage in the left eye but otherwise no other signs of trauma or injury. Awake alert and oriented. Ambulating appropriately. Head CT was negative. Do feel he can be discharged home. While awaiting his head CT results, the patient became somewhat agitated.   States he felt like he was being video recorded in the emergency department and ultimately called 911 while in the lobby. Police responded. He is somewhat agitated and seems very mildly paranoid but is not suicidal or homicidal.  He is not threatening others. He has no plans to hurt himself. He freely admits he has a history of underlying psychiatric disease and does have occasional hallucinations at time. I do not feel a psychiatric hold is necessary at this time and feel that he otherwise may be discharged home safely    Medications and Route:   Medications   acetaminophen (TYLENOL) tablet 500 mg (has no administration in time range)   LORazepam (ATIVAN) tablet 1 mg (has no administration in time range)       History From: Patient         Chronic Conditions: Noted in HPI    CONSULTS: (Who and What was discussed)  None        Records Reviewed : Source review of recent discharge summary from the emergency department just yesterday    Disposition Considerations (Tests not ordered but considered, Shared Decision Making, Pt Expectation of Test or Tx.):     Ivonne Serrano M.D., am the primary clinician of record. MDM      CONSULTS     None    Critical Care:   None    REASSESSMENT          PROCEDURE     Unless otherwise noted below, none     Procedures      FINAL IMPRESSION      1. Nonintractable headache, unspecified chronicity pattern, unspecified headache type    2. Fall, initial encounter    3. Umbilical hernia without obstruction and without gangrene            DISPOSITION/PLAN   DISPOSITION Decision To Discharge 02/08/2023 12:29:57 AM        PATIENT REFERRED TO:  Marbella Perry DO  [address] [place] [address]  [phone]    Schedule an appointment as soon as possible for a visit       DISCHARGE MEDICATIONS:  New Prescriptions    No medications on file     Controlled Substances Monitoring:     No flowsheet data found.     (Please note that portions of this note were completed with a voice recognition program.  Efforts were made to edit the dictations but occasionally words are mis-transcribed.)    Charis Le MD (electronically signed)  Attending Emergency Physician            Michelle Jarrett MD  02/08/23 [id number]

## 2023-05-12 ENCOUNTER — INITIAL CONSULT (OUTPATIENT)
Dept: SURGERY | Age: 53
End: 2023-05-12
Payer: COMMERCIAL

## 2023-05-12 VITALS
HEIGHT: 77 IN | WEIGHT: 218 LBS | HEART RATE: 65 BPM | BODY MASS INDEX: 25.74 KG/M2 | DIASTOLIC BLOOD PRESSURE: 62 MMHG | SYSTOLIC BLOOD PRESSURE: 118 MMHG

## 2023-05-12 DIAGNOSIS — K42.9 UMBILICAL HERNIA WITHOUT OBSTRUCTION AND WITHOUT GANGRENE: Primary | ICD-10-CM

## 2023-05-12 PROCEDURE — 99213 OFFICE O/P EST LOW 20 MIN: CPT | Performed by: SURGERY

## 2023-05-12 PROCEDURE — 4004F PT TOBACCO SCREEN RCVD TLK: CPT | Performed by: SURGERY

## 2023-05-12 PROCEDURE — G8419 CALC BMI OUT NRM PARAM NOF/U: HCPCS | Performed by: SURGERY

## 2023-05-12 PROCEDURE — G8427 DOCREV CUR MEDS BY ELIG CLIN: HCPCS | Performed by: SURGERY

## 2023-05-12 PROCEDURE — 3017F COLORECTAL CA SCREEN DOC REV: CPT | Performed by: SURGERY

## 2023-05-12 RX ORDER — SODIUM CHLORIDE 0.9 % (FLUSH) 0.9 %
5-40 SYRINGE (ML) INJECTION PRN
OUTPATIENT
Start: 2023-05-12

## 2023-05-12 RX ORDER — SODIUM CHLORIDE 9 MG/ML
INJECTION, SOLUTION INTRAVENOUS PRN
OUTPATIENT
Start: 2023-05-12

## 2023-05-12 RX ORDER — SODIUM CHLORIDE 0.9 % (FLUSH) 0.9 %
5-40 SYRINGE (ML) INJECTION EVERY 12 HOURS SCHEDULED
OUTPATIENT
Start: 2023-05-12

## 2023-05-12 NOTE — PATIENT INSTRUCTIONS
[address] [address]  Phone: [phone]  Fax: [phone] will be scheduled for surgery with Dr. Osmani Paul. The office will call you with the date and time that surgery is scheduled. Please take note of these instructions for surgery: You should have nothing by mouth after midnight the night before your surgery - this includes no food or water. Your surgery will be cancelled if you have taken anything by mouth after midnight, NO exceptions. You will need to have a history and physical prior to your surgery. This will need to be completed up to 30 days before your surgery. This H/P can be completed by your family doctor or the hospital.   IF you take coumadin (warfarin), please stop taking this medication 5 days prior to your surgery. IF you take plavix, please stop taking this 7 days prior to your surgery. Please contact our office if you have a pacemaker or defibrillator. IF you are allergic to latex, please tell our office prior to your surgery. This is important in know before scheduling your surgery. IF you are having an out patient surgery, you will need someone available to drive you home after your surgery, and to also stay with you for the rest of the day. IF you are having a surgery requiring an inpatient stay in the hospital, you will need someone to drive you home upon discharge from the hospital.  Please contact Dr. Kay Benavidez assistant Jude Owens if you have any questions or concerns. Please call the office with any changes in your symptoms or further questions/concerns.  [phone]

## 2023-05-12 NOTE — PROGRESS NOTES
Sexual activity: Yes     Partners: Female   Other Topics Concern    Not on file   Social History Narrative    Not on file     Social Determinants of Health     Financial Resource Strain: Not on file   Food Insecurity: Not on file   Transportation Needs: Not on file   Physical Activity: Not on file   Stress: Not on file   Social Connections: Not on file   Intimate Partner Violence: Not on file   Housing Stability: Not on file          A review of the patient's record including allergies, medication list, tobacco history, family history, problem list, medical history and social history has been completed and updates made to the patient's EMR where indicated. Vitals:    05/12/23 1409   BP: 118/62   Site: Right Wrist   Position: Sitting   Cuff Size: Medium Adult   Pulse: 65   Weight: 218 lb (98.9 kg)   Height: 6' 5.01\" (1.956 m)     Body mass index is 25.85 kg/m². Wt Readings from Last 3 Encounters:   05/12/23 218 lb (98.9 kg)   02/07/23 210 lb (95.3 kg)   09/19/22 200 lb (90.7 kg)     BP Readings from Last 3 Encounters:   05/12/23 118/62   02/07/23 (!) 152/92   02/07/23 131/83          REVIEW OF SYSTEMS:   All other systems reviewed; please refer to HPI with pertinent positives, all other ROS are negative    PHYSICAL EXAM:    CONSTITUTIONAL:  awake, alert, no apparent distress and normal weight  ENT:  normocepalic, without obvious abnormality  NECK:  supple, symmetrical, trachea midline   LUNGS:  Resp easy and unlabored  CARDIOVASCULAR:     ABDOMEN:  no scars,  , soft, non-distended, non-tender, involuntary guarding absent,  Examination for hernias: umbilical hernia, incarcerated, nontender, broad ~3-4cm bulge, difficult to appreciate the underlying fascial edges of the defect. Per patient, bulge is larger than last year but he is unsure by how much.     MUSCULOSKELETAL: No edema  NEUROLOGIC:  Mental Status Exam:  Level of Alertness:   awake  Orientation:   person, place, time      DATA:       ASSESSMENT:

## 2023-06-01 ENCOUNTER — TELEPHONE (OUTPATIENT)
Dept: PRIMARY CARE CLINIC | Age: 53
End: 2023-06-01

## 2023-06-06 NOTE — TELEPHONE ENCOUNTER
Patient called to advise he has no date for for surgery as of yet. Patient will call back when operation date is set up.

## 2025-05-30 ENCOUNTER — APPOINTMENT (OUTPATIENT)
Dept: CT IMAGING | Age: 55
End: 2025-05-30
Payer: COMMERCIAL

## 2025-05-30 ENCOUNTER — HOSPITAL ENCOUNTER (EMERGENCY)
Age: 55
Discharge: HOME | End: 2025-05-30
Attending: EMERGENCY MEDICINE
Payer: COMMERCIAL

## 2025-05-30 ENCOUNTER — APPOINTMENT (OUTPATIENT)
Dept: GENERAL RADIOLOGY | Age: 55
End: 2025-05-30
Payer: COMMERCIAL

## 2025-05-30 VITALS
RESPIRATION RATE: 11 BRPM | TEMPERATURE: 97.6 F | SYSTOLIC BLOOD PRESSURE: 142 MMHG | DIASTOLIC BLOOD PRESSURE: 74 MMHG | OXYGEN SATURATION: 96 % | HEART RATE: 82 BPM

## 2025-05-30 DIAGNOSIS — R11.2 NAUSEA AND VOMITING, UNSPECIFIED VOMITING TYPE: ICD-10-CM

## 2025-05-30 DIAGNOSIS — R07.9 CHEST PAIN, UNSPECIFIED TYPE: Primary | ICD-10-CM

## 2025-05-30 LAB
ALBUMIN SERPL-MCNC: 4.9 G/DL (ref 3.4–5)
ALBUMIN/GLOB SERPL: 1.8 {RATIO} (ref 1.1–2.2)
ALP SERPL-CCNC: 101 U/L (ref 40–129)
ALT SERPL-CCNC: 33 U/L (ref 10–40)
ANION GAP SERPL CALCULATED.3IONS-SCNC: 19 MMOL/L (ref 3–16)
AST SERPL-CCNC: 32 U/L (ref 15–37)
BASOPHILS # BLD: 0.1 K/UL (ref 0–0.2)
BASOPHILS NFR BLD: 0.8 %
BILIRUB SERPL-MCNC: 1 MG/DL (ref 0–1)
BUN SERPL-MCNC: 19 MG/DL (ref 7–20)
CALCIUM SERPL-MCNC: 9.5 MG/DL (ref 8.3–10.6)
CHLORIDE SERPL-SCNC: 95 MMOL/L (ref 99–110)
CO2 SERPL-SCNC: 23 MMOL/L (ref 21–32)
CREAT SERPL-MCNC: 0.9 MG/DL (ref 0.9–1.3)
DEPRECATED RDW RBC AUTO: 13.7 % (ref 12.4–15.4)
EOSINOPHIL # BLD: 0 K/UL (ref 0–0.6)
EOSINOPHIL NFR BLD: 0.1 %
FLUAV RNA RESP QL NAA+PROBE: NOT DETECTED
FLUBV RNA RESP QL NAA+PROBE: NOT DETECTED
GFR SERPLBLD CREATININE-BSD FMLA CKD-EPI: >90 ML/MIN/{1.73_M2}
GLUCOSE SERPL-MCNC: 155 MG/DL (ref 70–99)
HCT VFR BLD AUTO: 46.3 % (ref 40.5–52.5)
HGB BLD-MCNC: 15.4 G/DL (ref 13.5–17.5)
LACTATE BLDV-SCNC: 1.8 MMOL/L (ref 0.4–1.9)
LYMPHOCYTES # BLD: 2.1 K/UL (ref 1–5.1)
LYMPHOCYTES NFR BLD: 12.5 %
MCH RBC QN AUTO: 27.7 PG (ref 26–34)
MCHC RBC AUTO-ENTMCNC: 33.4 G/DL (ref 31–36)
MCV RBC AUTO: 82.9 FL (ref 80–100)
MONOCYTES # BLD: 0.6 K/UL (ref 0–1.3)
MONOCYTES NFR BLD: 3.5 %
NEUTROPHILS # BLD: 14.1 K/UL (ref 1.7–7.7)
NEUTROPHILS NFR BLD: 83.1 %
PLATELET # BLD AUTO: 206 K/UL (ref 135–450)
PMV BLD AUTO: 9.4 FL (ref 5–10.5)
POTASSIUM SERPL-SCNC: 3.6 MMOL/L (ref 3.5–5.1)
PROT SERPL-MCNC: 7.6 G/DL (ref 6.4–8.2)
RBC # BLD AUTO: 5.58 M/UL (ref 4.2–5.9)
SARS-COV-2 RNA RESP QL NAA+PROBE: NOT DETECTED
SODIUM SERPL-SCNC: 137 MMOL/L (ref 136–145)
TROPONIN, HIGH SENSITIVITY: 11 NG/L (ref 0–22)
TROPONIN, HIGH SENSITIVITY: 8 NG/L (ref 0–22)
WBC # BLD AUTO: 17 K/UL (ref 4–11)

## 2025-05-30 PROCEDURE — 6370000000 HC RX 637 (ALT 250 FOR IP): Performed by: EMERGENCY MEDICINE

## 2025-05-30 PROCEDURE — 87636 SARSCOV2 & INF A&B AMP PRB: CPT

## 2025-05-30 PROCEDURE — 83605 ASSAY OF LACTIC ACID: CPT

## 2025-05-30 PROCEDURE — 93005 ELECTROCARDIOGRAM TRACING: CPT | Performed by: EMERGENCY MEDICINE

## 2025-05-30 PROCEDURE — 6360000004 HC RX CONTRAST MEDICATION: Performed by: EMERGENCY MEDICINE

## 2025-05-30 PROCEDURE — 96374 THER/PROPH/DIAG INJ IV PUSH: CPT

## 2025-05-30 PROCEDURE — 71260 CT THORAX DX C+: CPT

## 2025-05-30 PROCEDURE — 99285 EMERGENCY DEPT VISIT HI MDM: CPT

## 2025-05-30 PROCEDURE — 6360000002 HC RX W HCPCS: Performed by: EMERGENCY MEDICINE

## 2025-05-30 PROCEDURE — 36415 COLL VENOUS BLD VENIPUNCTURE: CPT

## 2025-05-30 PROCEDURE — 85025 COMPLETE CBC W/AUTO DIFF WBC: CPT

## 2025-05-30 PROCEDURE — 71046 X-RAY EXAM CHEST 2 VIEWS: CPT

## 2025-05-30 PROCEDURE — 96376 TX/PRO/DX INJ SAME DRUG ADON: CPT

## 2025-05-30 PROCEDURE — 80053 COMPREHEN METABOLIC PANEL: CPT

## 2025-05-30 PROCEDURE — 84484 ASSAY OF TROPONIN QUANT: CPT

## 2025-05-30 RX ORDER — IOPAMIDOL 755 MG/ML
75 INJECTION, SOLUTION INTRAVASCULAR
Status: COMPLETED | OUTPATIENT
Start: 2025-05-30 | End: 2025-05-30

## 2025-05-30 RX ORDER — ACETAMINOPHEN 500 MG
1000 TABLET ORAL
Status: COMPLETED | OUTPATIENT
Start: 2025-05-30 | End: 2025-05-30

## 2025-05-30 RX ORDER — ONDANSETRON 2 MG/ML
4 INJECTION INTRAMUSCULAR; INTRAVENOUS ONCE
Status: COMPLETED | OUTPATIENT
Start: 2025-05-30 | End: 2025-05-30

## 2025-05-30 RX ORDER — SENNOSIDES 8.6 MG
325 CAPSULE ORAL ONCE
Status: COMPLETED | OUTPATIENT
Start: 2025-05-30 | End: 2025-05-30

## 2025-05-30 RX ADMIN — IOPAMIDOL 75 ML: 755 INJECTION, SOLUTION INTRAVENOUS at 18:33

## 2025-05-30 RX ADMIN — ASPIRIN 325 MG: 325 TABLET, COATED ORAL at 15:36

## 2025-05-30 RX ADMIN — ACETAMINOPHEN 1000 MG: 500 TABLET ORAL at 15:36

## 2025-05-30 RX ADMIN — ONDANSETRON 4 MG: 2 INJECTION, SOLUTION INTRAMUSCULAR; INTRAVENOUS at 15:36

## 2025-05-30 RX ADMIN — ONDANSETRON 4 MG: 2 INJECTION, SOLUTION INTRAMUSCULAR; INTRAVENOUS at 19:57

## 2025-05-30 ASSESSMENT — PAIN SCALES - GENERAL
PAINLEVEL_OUTOF10: 7
PAINLEVEL_OUTOF10: 7

## 2025-05-30 ASSESSMENT — PAIN DESCRIPTION - LOCATION
LOCATION: CHEST
LOCATION: CHEST

## 2025-05-30 ASSESSMENT — PAIN DESCRIPTION - ORIENTATION: ORIENTATION: LEFT

## 2025-05-30 ASSESSMENT — PAIN - FUNCTIONAL ASSESSMENT: PAIN_FUNCTIONAL_ASSESSMENT: 0-10

## 2025-05-30 ASSESSMENT — PAIN DESCRIPTION - DESCRIPTORS: DESCRIPTORS: ACHING

## 2025-05-30 NOTE — ED PROVIDER NOTES
Emergency Department Provider Note  Location: [facility] EMERGENCY DEPARTMENT  5/30/2025     Patient Identification  Luis Jimenez is a 54 y.o. male    Chief Complaint  Chest Pain (Left side aching)          HPI  (History provided by patient)  Patient presents with new left-sided chest pain, as well as nausea vomiting.  He endorses alcohol use last night.  Early this morning he developed persistent vomiting, and then chest pain.  He has left upper chest pain, somewhat worse with movement and breathing.  No fevers although has had chills and bodyaches.  No runny nose, cough, shortness of breath, abdominal pain, diarrhea, urine symptoms.  Denies any known past medical history-takes no medications.  Had pancreatitis and pericarditis years ago.  No sick contacts.    Nursing Notes reviewed.    Allergies:   Allergies   Allergen Reactions    Vicodin [Hydrocodone-Acetaminophen] Nausea And Vomiting       Past medical history:  has a past medical history of Acute pancreatitis and Pericarditis.    Past surgical history:  has no past surgical history on file.    Home medications:   Prior to Admission medications    Medication Sig Start Date End Date Taking? Authorizing Provider   sildenafil (VIAGRA) 100 MG tablet Take 1 tablet by mouth daily as needed for Erectile Dysfunction  Patient not taking: Reported on 2/7/2023 12/6/22   Padmini Quinonez DO   escitalopram (LEXAPRO) 10 MG tablet Take 1 tablet by mouth daily  Patient not taking: Reported on 2/7/2023 9/2/22   Padmini Quinonez DO       Social history:  reports that he has been smoking cigarettes. He has never used smokeless tobacco. He reports current alcohol use. He reports that he does not currently use drugs after having used the following drugs: Methamphetamines (Crystal Meth) and Marijuana (Weed).      Physical Exam  ED Triage Vitals   BP Systolic BP Percentile Diastolic BP Percentile Temp Temp src Pulse Respirations SpO2   05/30/25 1457 -- -- 05/30/25 1500 --

## 2025-05-31 LAB
EKG ATRIAL RATE: 75 BPM
EKG DIAGNOSIS: NORMAL
EKG P AXIS: 60 DEGREES
EKG P-R INTERVAL: 146 MS
EKG Q-T INTERVAL: 400 MS
EKG QRS DURATION: 84 MS
EKG QTC CALCULATION (BAZETT): 446 MS
EKG R AXIS: 36 DEGREES
EKG T AXIS: 46 DEGREES
EKG VENTRICULAR RATE: 75 BPM

## 2025-05-31 PROCEDURE — 93010 ELECTROCARDIOGRAM REPORT: CPT | Performed by: INTERNAL MEDICINE

## 2025-07-16 ENCOUNTER — APPOINTMENT (OUTPATIENT)
Dept: GENERAL RADIOLOGY | Age: 55
End: 2025-07-16
Payer: COMMERCIAL

## 2025-07-16 ENCOUNTER — HOSPITAL ENCOUNTER (INPATIENT)
Age: 55
LOS: 2 days | Discharge: HOME | End: 2025-07-18
Attending: EMERGENCY MEDICINE | Admitting: INTERNAL MEDICINE
Payer: COMMERCIAL

## 2025-07-16 DIAGNOSIS — I48.91 ATRIAL FIBRILLATION, UNSPECIFIED TYPE (HCC): Primary | ICD-10-CM

## 2025-07-16 DIAGNOSIS — R07.9 CHEST PAIN, UNSPECIFIED TYPE: ICD-10-CM

## 2025-07-16 DIAGNOSIS — I48.0 PAROXYSMAL ATRIAL FIBRILLATION (HCC): ICD-10-CM

## 2025-07-16 DIAGNOSIS — I20.9 ANGINA PECTORIS: ICD-10-CM

## 2025-07-16 PROBLEM — F39 MOOD DISORDER: Status: ACTIVE | Noted: 2025-07-16

## 2025-07-16 PROBLEM — K04.7 DENTAL INFECTION: Status: ACTIVE | Noted: 2025-07-16

## 2025-07-16 PROBLEM — F10.10 ALCOHOL ABUSE: Status: ACTIVE | Noted: 2025-07-16

## 2025-07-16 LAB
ALBUMIN SERPL-MCNC: 4 G/DL (ref 3.4–5)
ALBUMIN/GLOB SERPL: 1.5 {RATIO} (ref 1.1–2.2)
ALP SERPL-CCNC: 90 U/L (ref 40–129)
ALT SERPL-CCNC: 49 U/L (ref 10–40)
ANION GAP SERPL CALCULATED.3IONS-SCNC: 18 MMOL/L (ref 3–16)
AST SERPL-CCNC: 59 U/L (ref 15–37)
BASOPHILS # BLD: 0 K/UL (ref 0–0.2)
BASOPHILS NFR BLD: 0.3 %
BILIRUB SERPL-MCNC: 0.5 MG/DL (ref 0–1)
BUN SERPL-MCNC: 9 MG/DL (ref 7–20)
CALCIUM SERPL-MCNC: 9 MG/DL (ref 8.3–10.6)
CHLORIDE SERPL-SCNC: 98 MMOL/L (ref 99–110)
CO2 SERPL-SCNC: 20 MMOL/L (ref 21–32)
CREAT SERPL-MCNC: 0.8 MG/DL (ref 0.9–1.3)
DEPRECATED RDW RBC AUTO: 16.7 % (ref 12.4–15.4)
EKG DIAGNOSIS: NORMAL
EKG Q-T INTERVAL: 282 MS
EKG QRS DURATION: 72 MS
EKG QTC CALCULATION (BAZETT): 450 MS
EKG R AXIS: 37 DEGREES
EKG T AXIS: 22 DEGREES
EKG VENTRICULAR RATE: 153 BPM
EOSINOPHIL # BLD: 0 K/UL (ref 0–0.6)
EOSINOPHIL NFR BLD: 0.5 %
ETHANOLAMINE SERPL-MCNC: 151 MG/DL (ref 0–0.08)
GFR SERPLBLD CREATININE-BSD FMLA CKD-EPI: >90 ML/MIN/{1.73_M2}
GLUCOSE SERPL-MCNC: 159 MG/DL (ref 70–99)
HCT VFR BLD AUTO: 46.2 % (ref 40.5–52.5)
HGB BLD-MCNC: 15.5 G/DL (ref 13.5–17.5)
LYMPHOCYTES # BLD: 1.3 K/UL (ref 1–5.1)
LYMPHOCYTES NFR BLD: 22.2 %
MAGNESIUM SERPL-MCNC: 1.71 MG/DL (ref 1.8–2.4)
MCH RBC QN AUTO: 28.7 PG (ref 26–34)
MCHC RBC AUTO-ENTMCNC: 33.6 G/DL (ref 31–36)
MCV RBC AUTO: 85.4 FL (ref 80–100)
MONOCYTES # BLD: 0.5 K/UL (ref 0–1.3)
MONOCYTES NFR BLD: 7.7 %
NEUTROPHILS # BLD: 4.2 K/UL (ref 1.7–7.7)
NEUTROPHILS NFR BLD: 69.3 %
NT-PROBNP SERPL-MCNC: 51 PG/ML (ref 0–124)
PLATELET # BLD AUTO: 170 K/UL (ref 135–450)
PMV BLD AUTO: 9 FL (ref 5–10.5)
POTASSIUM SERPL-SCNC: 3.6 MMOL/L (ref 3.5–5.1)
POTASSIUM SERPL-SCNC: 3.8 MMOL/L (ref 3.5–5.1)
PROT SERPL-MCNC: 6.7 G/DL (ref 6.4–8.2)
RBC # BLD AUTO: 5.41 M/UL (ref 4.2–5.9)
SODIUM SERPL-SCNC: 136 MMOL/L (ref 136–145)
TROPONIN, HIGH SENSITIVITY: 14 NG/L (ref 0–22)
TROPONIN, HIGH SENSITIVITY: 17 NG/L (ref 0–22)
TSH SERPL DL<=0.005 MIU/L-ACNC: 0.36 UIU/ML (ref 0.27–4.2)
WBC # BLD AUTO: 6.1 K/UL (ref 4–11)

## 2025-07-16 PROCEDURE — 36415 COLL VENOUS BLD VENIPUNCTURE: CPT

## 2025-07-16 PROCEDURE — 84484 ASSAY OF TROPONIN QUANT: CPT

## 2025-07-16 PROCEDURE — 2580000003 HC RX 258: Performed by: PHYSICIAN ASSISTANT

## 2025-07-16 PROCEDURE — 82077 ASSAY SPEC XCP UR&BREATH IA: CPT

## 2025-07-16 PROCEDURE — 71045 X-RAY EXAM CHEST 1 VIEW: CPT

## 2025-07-16 PROCEDURE — 85025 COMPLETE CBC W/AUTO DIFF WBC: CPT

## 2025-07-16 PROCEDURE — 2500000003 HC RX 250 WO HCPCS

## 2025-07-16 PROCEDURE — 6360000002 HC RX W HCPCS

## 2025-07-16 PROCEDURE — 93005 ELECTROCARDIOGRAM TRACING: CPT | Performed by: EMERGENCY MEDICINE

## 2025-07-16 PROCEDURE — 84443 ASSAY THYROID STIM HORMONE: CPT

## 2025-07-16 PROCEDURE — 6370000000 HC RX 637 (ALT 250 FOR IP)

## 2025-07-16 PROCEDURE — 83880 ASSAY OF NATRIURETIC PEPTIDE: CPT

## 2025-07-16 PROCEDURE — 93010 ELECTROCARDIOGRAM REPORT: CPT | Performed by: INTERNAL MEDICINE

## 2025-07-16 PROCEDURE — 80053 COMPREHEN METABOLIC PANEL: CPT

## 2025-07-16 PROCEDURE — 84132 ASSAY OF SERUM POTASSIUM: CPT

## 2025-07-16 PROCEDURE — 2060000000 HC ICU INTERMEDIATE R&B

## 2025-07-16 PROCEDURE — 96366 THER/PROPH/DIAG IV INF ADDON: CPT

## 2025-07-16 PROCEDURE — 96365 THER/PROPH/DIAG IV INF INIT: CPT

## 2025-07-16 PROCEDURE — 83735 ASSAY OF MAGNESIUM: CPT

## 2025-07-16 PROCEDURE — 99285 EMERGENCY DEPT VISIT HI MDM: CPT

## 2025-07-16 PROCEDURE — 6360000002 HC RX W HCPCS: Performed by: PHYSICIAN ASSISTANT

## 2025-07-16 PROCEDURE — 99223 1ST HOSP IP/OBS HIGH 75: CPT | Performed by: INTERNAL MEDICINE

## 2025-07-16 RX ORDER — ONDANSETRON 2 MG/ML
4 INJECTION INTRAMUSCULAR; INTRAVENOUS EVERY 6 HOURS PRN
Status: DISCONTINUED | OUTPATIENT
Start: 2025-07-16 | End: 2025-07-18 | Stop reason: HOSPADM

## 2025-07-16 RX ORDER — DIAZEPAM 10 MG/1
10 TABLET ORAL
Status: DISCONTINUED | OUTPATIENT
Start: 2025-07-16 | End: 2025-07-18 | Stop reason: HOSPADM

## 2025-07-16 RX ORDER — SODIUM CHLORIDE 0.9 % (FLUSH) 0.9 %
10 SYRINGE (ML) INJECTION PRN
Status: DISCONTINUED | OUTPATIENT
Start: 2025-07-16 | End: 2025-07-18 | Stop reason: HOSPADM

## 2025-07-16 RX ORDER — SODIUM CHLORIDE 0.9 % (FLUSH) 0.9 %
5-40 SYRINGE (ML) INJECTION EVERY 12 HOURS SCHEDULED
Status: DISCONTINUED | OUTPATIENT
Start: 2025-07-16 | End: 2025-07-18 | Stop reason: HOSPADM

## 2025-07-16 RX ORDER — POTASSIUM CHLORIDE 1500 MG/1
40 TABLET, EXTENDED RELEASE ORAL PRN
Status: DISCONTINUED | OUTPATIENT
Start: 2025-07-16 | End: 2025-07-18 | Stop reason: HOSPADM

## 2025-07-16 RX ORDER — DIAZEPAM 5 MG/1
5 TABLET ORAL
Status: DISCONTINUED | OUTPATIENT
Start: 2025-07-16 | End: 2025-07-18 | Stop reason: HOSPADM

## 2025-07-16 RX ORDER — CHLORHEXIDINE GLUCONATE ORAL RINSE 1.2 MG/ML
15 SOLUTION DENTAL 2 TIMES DAILY
Status: DISCONTINUED | OUTPATIENT
Start: 2025-07-16 | End: 2025-07-18 | Stop reason: HOSPADM

## 2025-07-16 RX ORDER — DIAZEPAM 10 MG/2ML
5 INJECTION, SOLUTION INTRAMUSCULAR; INTRAVENOUS
Status: DISCONTINUED | OUTPATIENT
Start: 2025-07-16 | End: 2025-07-18 | Stop reason: HOSPADM

## 2025-07-16 RX ORDER — SODIUM CHLORIDE 9 MG/ML
INJECTION, SOLUTION INTRAVENOUS PRN
Status: DISCONTINUED | OUTPATIENT
Start: 2025-07-16 | End: 2025-07-18 | Stop reason: HOSPADM

## 2025-07-16 RX ORDER — DILTIAZEM HYDROCHLORIDE 5 MG/ML
20 INJECTION INTRAVENOUS ONCE
Status: COMPLETED | OUTPATIENT
Start: 2025-07-16 | End: 2025-07-16

## 2025-07-16 RX ORDER — DIAZEPAM 10 MG/2ML
10 INJECTION, SOLUTION INTRAMUSCULAR; INTRAVENOUS
Status: DISCONTINUED | OUTPATIENT
Start: 2025-07-16 | End: 2025-07-18 | Stop reason: HOSPADM

## 2025-07-16 RX ORDER — ENOXAPARIN SODIUM 100 MG/ML
1 INJECTION SUBCUTANEOUS 2 TIMES DAILY
Status: DISCONTINUED | OUTPATIENT
Start: 2025-07-16 | End: 2025-07-18 | Stop reason: HOSPADM

## 2025-07-16 RX ORDER — DIAZEPAM 10 MG/2ML
20 INJECTION, SOLUTION INTRAMUSCULAR; INTRAVENOUS
Status: DISCONTINUED | OUTPATIENT
Start: 2025-07-16 | End: 2025-07-18 | Stop reason: HOSPADM

## 2025-07-16 RX ORDER — POTASSIUM CHLORIDE 7.45 MG/ML
10 INJECTION INTRAVENOUS PRN
Status: DISCONTINUED | OUTPATIENT
Start: 2025-07-16 | End: 2025-07-18 | Stop reason: HOSPADM

## 2025-07-16 RX ORDER — ACETAMINOPHEN 325 MG/1
650 TABLET ORAL EVERY 6 HOURS PRN
Status: DISCONTINUED | OUTPATIENT
Start: 2025-07-16 | End: 2025-07-18 | Stop reason: HOSPADM

## 2025-07-16 RX ORDER — ONDANSETRON 4 MG/1
4 TABLET, ORALLY DISINTEGRATING ORAL EVERY 8 HOURS PRN
Status: DISCONTINUED | OUTPATIENT
Start: 2025-07-16 | End: 2025-07-18 | Stop reason: HOSPADM

## 2025-07-16 RX ORDER — DIAZEPAM 10 MG/2ML
15 INJECTION, SOLUTION INTRAMUSCULAR; INTRAVENOUS
Status: DISCONTINUED | OUTPATIENT
Start: 2025-07-16 | End: 2025-07-18 | Stop reason: HOSPADM

## 2025-07-16 RX ORDER — DIAZEPAM 10 MG/1
20 TABLET ORAL
Status: DISCONTINUED | OUTPATIENT
Start: 2025-07-16 | End: 2025-07-18 | Stop reason: HOSPADM

## 2025-07-16 RX ORDER — ACETAMINOPHEN 650 MG/1
650 SUPPOSITORY RECTAL EVERY 6 HOURS PRN
Status: DISCONTINUED | OUTPATIENT
Start: 2025-07-16 | End: 2025-07-18 | Stop reason: HOSPADM

## 2025-07-16 RX ORDER — 0.9 % SODIUM CHLORIDE 0.9 %
1000 INTRAVENOUS SOLUTION INTRAVENOUS ONCE
Status: COMPLETED | OUTPATIENT
Start: 2025-07-16 | End: 2025-07-16

## 2025-07-16 RX ORDER — MAGNESIUM SULFATE IN WATER 40 MG/ML
2000 INJECTION, SOLUTION INTRAVENOUS PRN
Status: DISCONTINUED | OUTPATIENT
Start: 2025-07-16 | End: 2025-07-18 | Stop reason: HOSPADM

## 2025-07-16 RX ORDER — POLYETHYLENE GLYCOL 3350 17 G/17G
17 POWDER, FOR SOLUTION ORAL DAILY PRN
Status: DISCONTINUED | OUTPATIENT
Start: 2025-07-16 | End: 2025-07-18 | Stop reason: HOSPADM

## 2025-07-16 RX ADMIN — DILTIAZEM HYDROCHLORIDE 20 MG: 5 INJECTION, SOLUTION INTRAVENOUS at 12:37

## 2025-07-16 RX ADMIN — ONDANSETRON 4 MG: 2 INJECTION, SOLUTION INTRAMUSCULAR; INTRAVENOUS at 17:08

## 2025-07-16 RX ADMIN — DIAZEPAM 5 MG: 5 INJECTION, SOLUTION INTRAMUSCULAR; INTRAVENOUS at 17:08

## 2025-07-16 RX ADMIN — DILTIAZEM HYDROCHLORIDE 5 MG/HR: 5 INJECTION, SOLUTION INTRAVENOUS at 12:49

## 2025-07-16 RX ADMIN — SODIUM CHLORIDE, PRESERVATIVE FREE 10 ML: 5 INJECTION INTRAVENOUS at 20:51

## 2025-07-16 RX ADMIN — DIAZEPAM 15 MG: 5 INJECTION, SOLUTION INTRAMUSCULAR; INTRAVENOUS at 20:51

## 2025-07-16 RX ADMIN — ENOXAPARIN SODIUM 100 MG: 100 INJECTION SUBCUTANEOUS at 21:01

## 2025-07-16 RX ADMIN — AMOXICILLIN AND CLAVULANATE POTASSIUM 1 TABLET: 875; 125 TABLET, FILM COATED ORAL at 20:51

## 2025-07-16 RX ADMIN — SODIUM CHLORIDE 1000 ML: 0.9 INJECTION, SOLUTION INTRAVENOUS at 13:23

## 2025-07-16 ASSESSMENT — LIFESTYLE VARIABLES
HOW MANY STANDARD DRINKS CONTAINING ALCOHOL DO YOU HAVE ON A TYPICAL DAY: 1 OR 2
HOW OFTEN DO YOU HAVE A DRINK CONTAINING ALCOHOL: 4 OR MORE TIMES A WEEK
HOW MANY STANDARD DRINKS CONTAINING ALCOHOL DO YOU HAVE ON A TYPICAL DAY: 1 OR 2
HOW OFTEN DO YOU HAVE A DRINK CONTAINING ALCOHOL: 2-4 TIMES A MONTH

## 2025-07-16 ASSESSMENT — PAIN SCALES - GENERAL: PAINLEVEL_OUTOF10: 5

## 2025-07-16 NOTE — ED PROVIDER NOTES
I personally saw the patient and made/approved the management plan and take responsibility for the patient management.    History: 54-year-old male who reports waking up this morning with anxiety and feeling his heartbeat in his neck.  Denies any history of atrial fibrillation or cardiac dysrhythmias.  Denies any leg swelling or hemoptysis.    Exam: Pleasant adult  male.  Awake and alert.  Irregularly irregular rhythm with rate of 150.  Intact distal pulses.  Symmetric smile.  No numbness or weakness.  No focal neurologic deficits.    MDM: Patient atrial fibrillation with rapid ventricular response.  Maintaining normal perfusion and blood pressure.  No focal neurologic deficits.  Patient starting diltiazem bolus and drip with successful rate control is admitted for further evaluation and care.  Patient expresses understanding and agreement with this plan.    The Ekg interpreted by me shows  atrial fibrillation with a rate of 153  Axis is   Normal  QTc is  450ms  Intervals and Durations are unremarkable.      ST Segments: nonspecific changes  Atrial fibrillation has replaced normal sinus rhythm, significant increase in rate, nonspecific ST changes all new from previous EKG on 5/30/2025      For further details of this patient's emergency department encounter, please see the advanced practice provider's documentation.    FINAL IMPRESSION      1. Atrial fibrillation, unspecified type (HCC)    2. Chest pain, unspecified type             Varun Machado MD  07/16/25 7216

## 2025-07-16 NOTE — ED NOTES
Luis Jimenez is a 54 y.o. male admitted for  Principal Problem:    Atrial fibrillation with RVR ([facility])  Resolved Problems:    * No resolved hospital problems. *  .   Patient Home via EMS transportation with   Chief Complaint   Patient presents with    Chest Pain     States woke up this AM and chest felt erratic. Drank vodka last night and took blew chew. States heart felt \"really weird.\" SOB x1 hour. CP on L side into L shoulder, down hand. Also L arm \"tingly.    .  Patient is alert and Person, Place, Time, and Situation  Patient's baseline mobility: Baseline Mobility: Independent   Code Status: No Order   Cardiac Rhythm: Cardiac Rhythm: A fib RVR     Is patient on baseline Oxygen: no how many Liters:   Abnormal Assessment Findings: a fib RVR    Isolation: None      NIH Score:    C-SSRS: Risk of Suicide: No Risk  Bedside swallow:        Active LDA's:   Peripheral IV 07/16/25 Right Antecubital (Active)   Site Assessment Clean, dry & intact 07/16/25 1234   Line Status Blood return noted;Brisk blood return;Flushed;Specimen collected;Normal saline locked 07/16/25 1234   Line Care Connections checked and tightened 07/16/25 1234   Phlebitis Assessment No symptoms 07/16/25 1234   Infiltration Assessment 0 07/16/25 1234   Dressing Status New dressing applied;Clean, dry & intact 07/16/25 1234   Dressing Type Transparent 07/16/25 1234   Dressing Intervention New 07/16/25 1234     Patient admitted with a suarez: no If the suarez is chronic was it exchanged:No  Reason for suarez:   Patient admitted with Central Line:  NA . PICC line placement confirmed: YES OR NO:[id number]}   Reason for Central line:   Was central line Inserted from an outside facility: no       Family/Caregiver Present no Any Concerns: no   Restraints no  Sitter no         Vitals:      Vitals:    07/16/25 1347 07/16/25 1403 07/16/25 1417 07/16/25 1433   BP: 107/88 (!) 141/85 128/81 129/82   Pulse: (!) 116 (!) 115 98 99   Resp: 15 25 22 18   Temp:       SpO2: 97% 97%

## 2025-07-16 NOTE — H&P
RADIOLOGY  XR CHEST PORTABLE   Final Result   No acute cardiopulmonary findings             Principal Problem:    Atrial fibrillation with RVR (HCC)  Resolved Problems:    * No resolved hospital problems. *        I JAY FOLEY MD have reviewed the chart on Luis Jimenez and personally interviewed and examined patient, reviewed the data (labs and imaging) and after discussion with my PA formulated the plan.  Agree with note with the following edits.    HPI:     The patient is a 54-year-old white male with a history of alcohol abuse, mood disorder, substance abuse who came to the emergency room with palpitations.  He noticed them yesterday and then again this morning.  He initially thought he was having a panic attack.  This morning he noticed that his pulse was fast and irregular.  He has been drinking a lot of alcohol lately.  He also had some chest pressure.  Patient was recently having issues with a dental infection was taken and antibiotic.  He has been drinking more alcohol recently.  He does smoke marijuana.    I reviewed the patient's Past Medical History, Past Surgical History, Medications, and Allergies.     Physical exam:    /87   Pulse (!) 101   Temp 98.8 °F (37.1 °C) (Oral)   Resp 18   SpO2 96%     Gen: No distress. Alert.   Eyes: PERRL. No sclera icterus. No conjunctival injection.   ENT: No discharge. Pharynx clear.   Neck: Trachea midline. Normal thyroid.   Resp: No accessory muscle use. No crackles. No wheezes. No rhonchi. No dullness on percussion.  CV: Irregularly irregular rate and rhythm. No murmur or rub. No edema.          ASSESSMENT/PLAN:  #New onset atrial fibrillation with RVR  -endorse CP, sob, and palpitations on arrival  -initial HR in 140s/50s  -[id number] Score for Atrial Fibrillation Stroke Risk   Risk   Factors  Component Value   C CHF No 0   H HTN No 0   A2 Age >= 75 No,  (54 y.o.) 0   D DM No 0   S2 Prior Stroke/TIA No 0   V Vascular Disease No 0   A Age  65-74 No,  (54 y.o.) 0   Sc Sex male 0    [id number]  Score  0   -started on therapeutic lovenox-> continue AC pending cardiology recommendations; score of 0 as above   -started on cardizem drip in ED-> continued  -potassium wnl; magnesium and TSH pending  -echo pending   -admit to PCU and monitor on tele  -cardiology consult    #Dental infection   -dental infection of lower left dentition   -started on keflex and bactrim by urgent care 2 days ago  -continue augmentin and peridex here    #Alcohol Abuse  #Alcohol Withdrawal  -reports drinking ~ 1 pint of vodka per day, last drink this morning  -EtOH level: pending  -fall and seizure precautions  -CIWA protocol with PRN valium  -start multivitamin, folate, and thiamine  -CM for resources    #Hx of polysubstance abuse  -hx of meth, cocaine, marijuana abuse- endorses occassional marijuana use but not others recently   -UDS pending    #Mood disorder  -has been on lexapro in past- no longer taking    #Umbilical hernia  -chronic-has had for years  -denies pain  -has seen surgery outpatient    #Testicular mass-left   -chronic for about 10 years  -recommend continued follow up with pcp    #Nicotine use disorder  -recommend cessation  -replacement offered    #Hx of pancreatitis    Note above makes patient higher risk for morbidity and mortality requiring testing and treatment.   DVT Prophylaxis: Lovenox  Diet: No diet orders on file  Code Status: No Order    Kay Gonzales PA-C 7/16/2025 2:41 PM      JAY FOLEY MD 7/16/2025 10:49 PM

## 2025-07-16 NOTE — ED PROVIDER NOTES
[facility] EMERGENCY DEPARTMENT  EMERGENCY DEPARTMENT ENCOUNTER        Pt Name: Luis Jimenez  MRN: [id number]  Birthdate 1970  Date of evaluation: 7/16/2025  Provider: Luiza Leone PA-C  PCP: Rodolfo Gee DO  Note Started: 2:07 PM EDT 7/16/25       I have seen and evaluated this patient with my supervising physician Varun Machado MD.      CHIEF COMPLAINT       Chief Complaint   Patient presents with    Chest Pain     States woke up this AM and chest felt erratic. Drank vodka last night and took blew chew. States heart felt \"really weird.\" SOB x1 hour. CP on L side into L shoulder, down hand. Also L arm \"tingly.        HISTORY OF PRESENT ILLNESS: 1 or more Elements     History From: Patient            Chief Complaint: Chest pain    Luis Jimenez is a 54 y.o. male who presents he tells me that he woke up this morning at 8 AM and immediately felt unwell.  He thought he was having a panic attack.  He was diaphoretic and felt his heartbeat abnormally in his neck.  He developed chest pain on the left side radiating to his back with shortness of breath.  He tried to calm down and take a nap and drink 5 shots of liquor.  He then vomited.  He denies any abdominal pain or diarrhea, no recent fever or cough.  He states he has had pericarditis in the past but otherwise no heart conditions or history of A-fib.  He is not anticoagulated.  He has a history of alcohol abuse.  Apparently he recently quit drinking but due to the recent passing of his nephew he has been drinking lately recently.  He states he used to use illicit drugs and he is declining any current drug use now.  He did take a Viagra like medicine last night for the first time.    Nursing Notes were all reviewed and agreed with or any disagreements were addressed in the HPI.    REVIEW OF SYSTEMS :      Review of Systems    Positives and Pertinent negatives as per HPI.     SURGICAL HISTORY   No past surgical history on  assess for chest pain and remains in A-fib with RVR, heart rate improved to 110-120    RADIOLOGY:   Non-plain film images such as CT, Ultrasound and MRI are read by the radiologist.     Per my interpretation no acute cardiopulmonary process on chest x-ray    Interpretation per the Radiologist below, if available at the time of this note:    XR CHEST PORTABLE   Final Result   No acute cardiopulmonary findings           XR CHEST PORTABLE  Result Date: 7/16/2025  EXAMINATION: ONE XRAY VIEW OF THE CHEST 7/16/2025 12:27 pm COMPARISON: None. HISTORY: ORDERING SYSTEM PROVIDED HISTORY: chest pain TECHNOLOGIST PROVIDED HISTORY: Reason for exam:->chest pain Reason for Exam: chest pain FINDINGS: Normal cardiomediastinal silhouette.  No acute airspace infiltrate.  No pneumothorax or pleural effusion     No acute cardiopulmonary findings         ED Provider US Interpretation.    No results found.    PROCEDURES   Unless otherwise noted below, none     Procedures      CRITICAL CARE TIME (.cctime)    Upon my evaluation, this patient had a high probability of imminent or life-threatening deterioration due to A-fib with RVR, chest pain which required my direct attention, intervention, and personal management.  I have personally provided 30 minutes of critical care time exclusive of time spent on separately billable procedures. Time includes review of laboratory data, radiology results, discussion with consultants, and monitoring for potential decompensation. Interventions were performed as documented above.  See interventions in ED course.     EMERGENCY DEPARTMENT COURSE and DIFFERENTIAL DIAGNOSIS/MDM:   Vitals:    Vitals:    07/16/25 1317 07/16/25 1331 07/16/25 1347 07/16/25 1403   BP: (!) 121/98 (!) 145/94 107/88 (!) 141/85   Pulse: (!) 125 (!) 103 (!) 116 (!) 115   Resp: 27 16 15 25   Temp:       SpO2: 94% 95% 97% 97%       [unfilled]    Patient was given the following medications:  Medications   dilTIAZem injection 20 mg (20

## 2025-07-17 ENCOUNTER — APPOINTMENT (OUTPATIENT)
Age: 55
End: 2025-07-17
Attending: INTERNAL MEDICINE
Payer: COMMERCIAL

## 2025-07-17 ENCOUNTER — ANESTHESIA EVENT (OUTPATIENT)
Dept: ENDOSCOPY | Age: 55
End: 2025-07-17
Payer: COMMERCIAL

## 2025-07-17 PROBLEM — I48.91 ATRIAL FIBRILLATION (HCC): Status: ACTIVE | Noted: 2025-07-17

## 2025-07-17 PROBLEM — I20.89 ANGINA AT REST: Status: ACTIVE | Noted: 2025-07-17

## 2025-07-17 LAB
ANION GAP SERPL CALCULATED.3IONS-SCNC: 11 MMOL/L (ref 3–16)
BASOPHILS # BLD: 0 K/UL (ref 0–0.2)
BASOPHILS NFR BLD: 0.4 %
BUN SERPL-MCNC: 10 MG/DL (ref 7–20)
CALCIUM SERPL-MCNC: 8.1 MG/DL (ref 8.3–10.6)
CHLORIDE SERPL-SCNC: 100 MMOL/L (ref 99–110)
CHOLEST SERPL-MCNC: 169 MG/DL (ref 0–199)
CO2 SERPL-SCNC: 28 MMOL/L (ref 21–32)
CREAT SERPL-MCNC: 0.8 MG/DL (ref 0.9–1.3)
DEPRECATED RDW RBC AUTO: 16.1 % (ref 12.4–15.4)
ECHO AO ASC DIAM: 3.3 CM
ECHO AO ASCENDING AORTA INDEX: 1.43 CM/M2
ECHO AO ROOT DIAM: 3.5 CM
ECHO AO ROOT INDEX: 1.52 CM/M2
ECHO AV MEAN GRADIENT: 3 MMHG
ECHO AV MEAN VELOCITY: 0.9 M/S
ECHO AV PEAK GRADIENT: 5 MMHG
ECHO AV PEAK VELOCITY: 1.1 M/S
ECHO AV VELOCITY RATIO: 0.91
ECHO AV VTI: 15.7 CM
ECHO AV VTI: 15.7 CM
ECHO BSA: 2.3 M2
ECHO EST RA PRESSURE: 8 MMHG
ECHO LA AREA 2C: 16.5 CM2
ECHO LA AREA 4C: 14.8 CM2
ECHO LA MAJOR AXIS: 5.3 CM
ECHO LA MINOR AXIS: 5.1 CM
ECHO LA VOL BP: 38 ML (ref 18–58)
ECHO LA VOL MOD A2C: 43 ML (ref 18–58)
ECHO LA VOL MOD A4C: 32 ML (ref 18–58)
ECHO LA VOL/BSA BIPLANE: 17 ML/M2 (ref 16–34)
ECHO LA VOLUME INDEX MOD A2C: 19 ML/M2 (ref 16–34)
ECHO LA VOLUME INDEX MOD A4C: 14 ML/M2 (ref 16–34)
ECHO LV E' LATERAL VELOCITY: 10.6 CM/S
ECHO LV E' SEPTAL VELOCITY: 10.1 CM/S
ECHO LV EDV A2C: 58 ML
ECHO LV EDV A4C: 67 ML
ECHO LV EDV INDEX A4C: 29 ML/M2
ECHO LV EDV NDEX A2C: 25 ML/M2
ECHO LV EF PHYSICIAN: 58 %
ECHO LV EJECTION FRACTION A2C: 58 %
ECHO LV EJECTION FRACTION A4C: 59 %
ECHO LV ESV A2C: 24 ML
ECHO LV ESV A4C: 28 ML
ECHO LV ESV INDEX A2C: 10 ML/M2
ECHO LV ESV INDEX A4C: 12 ML/M2
ECHO LV FRACTIONAL SHORTENING: 30 % (ref 28–44)
ECHO LV INTERNAL DIMENSION DIASTOLE INDEX: 1.74 CM/M2
ECHO LV INTERNAL DIMENSION DIASTOLIC: 4 CM (ref 4.2–5.9)
ECHO LV INTERNAL DIMENSION SYSTOLIC INDEX: 1.22 CM/M2
ECHO LV INTERNAL DIMENSION SYSTOLIC: 2.8 CM
ECHO LV IVSD: 1.4 CM (ref 0.6–1)
ECHO LV MASS 2D: 209 G (ref 88–224)
ECHO LV MASS INDEX 2D: 90.9 G/M2 (ref 49–115)
ECHO LV POSTERIOR WALL DIASTOLIC: 1.4 CM (ref 0.6–1)
ECHO LV RELATIVE WALL THICKNESS RATIO: 0.7
ECHO LVOT MEAN GRADIENT: 2 MMHG
ECHO LVOT PEAK GRADIENT: 4 MMHG
ECHO LVOT PEAK VELOCITY: 1 M/S
ECHO LVOT VTI: 14.8 CM
ECHO MV E VELOCITY: 0.94 M/S
ECHO MV E/E' LATERAL: 8.87
ECHO MV E/E' RATIO (AVERAGED): 9.09
ECHO MV E/E' SEPTAL: 9.31
ECHO MV LVOT VTI INDEX: 0.96
ECHO MV MAX VELOCITY: 0.7 M/S
ECHO MV MEAN GRADIENT: 1 MMHG
ECHO MV MEAN VELOCITY: 0.5 M/S
ECHO MV PEAK GRADIENT: 2 MMHG
ECHO MV VTI: 14.2 CM
ECHO PV MAX VELOCITY: 0.8 M/S
ECHO PV PEAK GRADIENT: 3 MMHG
ECHO RA AREA 4C: 11.5 CM2
ECHO RA END SYSTOLIC VOLUME APICAL 4 CHAMBER INDEX BSA: 10 ML/M2
ECHO RA VOLUME: 22 ML
ECHO RV BASAL DIMENSION: 3.2 CM
ECHO RV FREE WALL PEAK S': 12.9 CM/S
ECHO RV LONGITUDINAL DIMENSION: 6.9 CM
ECHO RV MID DIMENSION: 2.5 CM
ECHO RV TAPSE: 1.7 CM (ref 1.7–?)
EKG DIAGNOSIS: NORMAL
EKG Q-T INTERVAL: 362 MS
EKG QRS DURATION: 86 MS
EKG QTC CALCULATION (BAZETT): 438 MS
EKG R AXIS: 17 DEGREES
EKG T AXIS: 42 DEGREES
EKG VENTRICULAR RATE: 88 BPM
EOSINOPHIL # BLD: 0.1 K/UL (ref 0–0.6)
EOSINOPHIL NFR BLD: 0.9 %
GFR SERPLBLD CREATININE-BSD FMLA CKD-EPI: >90 ML/MIN/{1.73_M2}
GLUCOSE SERPL-MCNC: 133 MG/DL (ref 70–99)
HCT VFR BLD AUTO: 46.3 % (ref 40.5–52.5)
HDLC SERPL-MCNC: 63 MG/DL (ref 40–60)
HGB BLD-MCNC: 15.2 G/DL (ref 13.5–17.5)
INR PPP: 1.01 (ref 0.86–1.14)
LDLC SERPL CALC-MCNC: 79 MG/DL
LYMPHOCYTES # BLD: 1.9 K/UL (ref 1–5.1)
LYMPHOCYTES NFR BLD: 28.2 %
MCH RBC QN AUTO: 28.7 PG (ref 26–34)
MCHC RBC AUTO-ENTMCNC: 33 G/DL (ref 31–36)
MCV RBC AUTO: 87.1 FL (ref 80–100)
MONOCYTES # BLD: 0.6 K/UL (ref 0–1.3)
MONOCYTES NFR BLD: 9.5 %
NEUTROPHILS # BLD: 4.1 K/UL (ref 1.7–7.7)
NEUTROPHILS NFR BLD: 61 %
PLATELET # BLD AUTO: 152 K/UL (ref 135–450)
PMV BLD AUTO: 9.5 FL (ref 5–10.5)
POTASSIUM SERPL-SCNC: 3.8 MMOL/L (ref 3.5–5.1)
PROTHROMBIN TIME: 13.6 SEC (ref 12.1–14.9)
RBC # BLD AUTO: 5.31 M/UL (ref 4.2–5.9)
SODIUM SERPL-SCNC: 139 MMOL/L (ref 136–145)
TRIGL SERPL-MCNC: 135 MG/DL (ref 0–150)
VLDLC SERPL CALC-MCNC: 27 MG/DL
WBC # BLD AUTO: 6.7 K/UL (ref 4–11)

## 2025-07-17 PROCEDURE — 6360000002 HC RX W HCPCS

## 2025-07-17 PROCEDURE — 93005 ELECTROCARDIOGRAM TRACING: CPT

## 2025-07-17 PROCEDURE — 6370000000 HC RX 637 (ALT 250 FOR IP)

## 2025-07-17 PROCEDURE — 85610 PROTHROMBIN TIME: CPT

## 2025-07-17 PROCEDURE — 85025 COMPLETE CBC W/AUTO DIFF WBC: CPT

## 2025-07-17 PROCEDURE — 2580000003 HC RX 258

## 2025-07-17 PROCEDURE — 80061 LIPID PANEL: CPT

## 2025-07-17 PROCEDURE — 99223 1ST HOSP IP/OBS HIGH 75: CPT | Performed by: INTERNAL MEDICINE

## 2025-07-17 PROCEDURE — 6370000000 HC RX 637 (ALT 250 FOR IP): Performed by: INTERNAL MEDICINE

## 2025-07-17 PROCEDURE — 93306 TTE W/DOPPLER COMPLETE: CPT | Performed by: INTERNAL MEDICINE

## 2025-07-17 PROCEDURE — 36415 COLL VENOUS BLD VENIPUNCTURE: CPT

## 2025-07-17 PROCEDURE — 2060000000 HC ICU INTERMEDIATE R&B

## 2025-07-17 PROCEDURE — 93010 ELECTROCARDIOGRAM REPORT: CPT | Performed by: INTERNAL MEDICINE

## 2025-07-17 PROCEDURE — 80048 BASIC METABOLIC PNL TOTAL CA: CPT

## 2025-07-17 PROCEDURE — 99232 SBSQ HOSP IP/OBS MODERATE 35: CPT | Performed by: INTERNAL MEDICINE

## 2025-07-17 PROCEDURE — 2500000003 HC RX 250 WO HCPCS

## 2025-07-17 PROCEDURE — 93306 TTE W/DOPPLER COMPLETE: CPT

## 2025-07-17 RX ORDER — OXYCODONE HYDROCHLORIDE 5 MG/1
10 TABLET ORAL PRN
Refills: 0 | Status: CANCELLED | OUTPATIENT
Start: 2025-07-17 | End: 2025-07-17

## 2025-07-17 RX ORDER — SODIUM CHLORIDE 9 MG/ML
INJECTION, SOLUTION INTRAVENOUS PRN
Status: CANCELLED | OUTPATIENT
Start: 2025-07-17

## 2025-07-17 RX ORDER — ONDANSETRON 2 MG/ML
4 INJECTION INTRAMUSCULAR; INTRAVENOUS
Status: CANCELLED | OUTPATIENT
Start: 2025-07-17

## 2025-07-17 RX ORDER — SODIUM CHLORIDE, SODIUM LACTATE, POTASSIUM CHLORIDE, CALCIUM CHLORIDE 600; 310; 30; 20 MG/100ML; MG/100ML; MG/100ML; MG/100ML
INJECTION, SOLUTION INTRAVENOUS CONTINUOUS
Status: CANCELLED | OUTPATIENT
Start: 2025-07-17

## 2025-07-17 RX ORDER — SODIUM CHLORIDE 0.9 % (FLUSH) 0.9 %
5-40 SYRINGE (ML) INJECTION PRN
Status: CANCELLED | OUTPATIENT
Start: 2025-07-17

## 2025-07-17 RX ORDER — LANOLIN ALCOHOL/MO/W.PET/CERES
100 CREAM (GRAM) TOPICAL DAILY
Status: DISCONTINUED | OUTPATIENT
Start: 2025-07-17 | End: 2025-07-18 | Stop reason: HOSPADM

## 2025-07-17 RX ORDER — SODIUM CHLORIDE 0.9 % (FLUSH) 0.9 %
5-40 SYRINGE (ML) INJECTION EVERY 12 HOURS SCHEDULED
Status: CANCELLED | OUTPATIENT
Start: 2025-07-17

## 2025-07-17 RX ORDER — MEPERIDINE HYDROCHLORIDE 25 MG/ML
12.5 INJECTION INTRAMUSCULAR; INTRAVENOUS; SUBCUTANEOUS EVERY 5 MIN PRN
Refills: 0 | Status: CANCELLED | OUTPATIENT
Start: 2025-07-17

## 2025-07-17 RX ORDER — METOPROLOL TARTRATE 25 MG/1
25 TABLET, FILM COATED ORAL 2 TIMES DAILY
Status: DISCONTINUED | OUTPATIENT
Start: 2025-07-17 | End: 2025-07-18 | Stop reason: HOSPADM

## 2025-07-17 RX ORDER — SODIUM CHLORIDE 0.9 % (FLUSH) 0.9 %
5-40 SYRINGE (ML) INJECTION EVERY 12 HOURS SCHEDULED
OUTPATIENT
Start: 2025-07-17

## 2025-07-17 RX ORDER — SODIUM CHLORIDE 0.9 % (FLUSH) 0.9 %
5-40 SYRINGE (ML) INJECTION PRN
OUTPATIENT
Start: 2025-07-17

## 2025-07-17 RX ORDER — OXYCODONE HYDROCHLORIDE 5 MG/1
5 TABLET ORAL PRN
Refills: 0 | Status: CANCELLED | OUTPATIENT
Start: 2025-07-17 | End: 2025-07-17

## 2025-07-17 RX ORDER — SODIUM CHLORIDE 9 MG/ML
INJECTION, SOLUTION INTRAVENOUS PRN
OUTPATIENT
Start: 2025-07-17

## 2025-07-17 RX ADMIN — ENOXAPARIN SODIUM 100 MG: 100 INJECTION SUBCUTANEOUS at 08:45

## 2025-07-17 RX ADMIN — METOPROLOL TARTRATE 25 MG: 25 TABLET, FILM COATED ORAL at 19:58

## 2025-07-17 RX ADMIN — DIAZEPAM 5 MG: 5 INJECTION, SOLUTION INTRAMUSCULAR; INTRAVENOUS at 08:55

## 2025-07-17 RX ADMIN — AMOXICILLIN AND CLAVULANATE POTASSIUM 1 TABLET: 875; 125 TABLET, FILM COATED ORAL at 19:58

## 2025-07-17 RX ADMIN — DILTIAZEM HYDROCHLORIDE 5 MG/HR: 5 INJECTION, SOLUTION INTRAVENOUS at 19:56

## 2025-07-17 RX ADMIN — SODIUM CHLORIDE, PRESERVATIVE FREE 10 ML: 5 INJECTION INTRAVENOUS at 08:47

## 2025-07-17 RX ADMIN — ACETAMINOPHEN 650 MG: 325 TABLET ORAL at 08:45

## 2025-07-17 RX ADMIN — ENOXAPARIN SODIUM 100 MG: 100 INJECTION SUBCUTANEOUS at 19:59

## 2025-07-17 RX ADMIN — CHLORHEXIDINE GLUCONATE 0.12% ORAL RINSE 15 ML: 1.2 LIQUID ORAL at 08:45

## 2025-07-17 RX ADMIN — AMOXICILLIN AND CLAVULANATE POTASSIUM 1 TABLET: 875; 125 TABLET, FILM COATED ORAL at 08:45

## 2025-07-17 RX ADMIN — CHLORHEXIDINE GLUCONATE 0.12% ORAL RINSE 15 ML: 1.2 LIQUID ORAL at 19:58

## 2025-07-17 RX ADMIN — Medication 100 MG: at 13:38

## 2025-07-17 RX ADMIN — ONDANSETRON 4 MG: 2 INJECTION, SOLUTION INTRAMUSCULAR; INTRAVENOUS at 08:54

## 2025-07-17 RX ADMIN — DILTIAZEM HYDROCHLORIDE 5 MG/HR: 5 INJECTION, SOLUTION INTRAVENOUS at 01:57

## 2025-07-17 RX ADMIN — METOPROLOL TARTRATE 25 MG: 25 TABLET, FILM COATED ORAL at 13:38

## 2025-07-17 ASSESSMENT — PAIN SCALES - GENERAL
PAINLEVEL_OUTOF10: 0
PAINLEVEL_OUTOF10: 0
PAINLEVEL_OUTOF10: 3
PAINLEVEL_OUTOF10: 0

## 2025-07-17 ASSESSMENT — PAIN DESCRIPTION - ORIENTATION: ORIENTATION: MID

## 2025-07-17 ASSESSMENT — PAIN DESCRIPTION - DESCRIPTORS: DESCRIPTORS: ACHING;DISCOMFORT

## 2025-07-17 ASSESSMENT — PAIN DESCRIPTION - LOCATION: LOCATION: HEAD

## 2025-07-17 NOTE — PROGRESS NOTES
Progress Note    Admit Date:  7/16/2025    Subjective:  Mr. Jimenez is feeling better. Still in a fib.  Thinks he maybe having some mild symptoms of  alcohol withdrawal.    Objective:   /80   Pulse 96   Temp 98.1 °F (36.7 °C) (Oral)   Resp 18   Ht 1.956 m (6' 5\")   Wt 97.1 kg (214 lb)   SpO2 98%   BMI 25.38 kg/m²        Intake/Output Summary (Last 24 hours) at 7/17/2025 1256  Last data filed at 7/17/2025 1006  Gross per 24 hour   Intake 462 ml   Output 350 ml   Net 112 ml       Physical Exam:    General appearance: alert, appears stated age and cooperative  Head: Normocephalic, without obvious abnormality, atraumatic  Eyes: conjunctivae/corneas clear. PERRL, EOM's intact.  Neck: no adenopathy, no carotid bruit, no JVD, supple, symmetrical, trachea midline and thyroid not enlarged, symmetric, no tenderness/mass/nodules  Lungs: clear to auscultation bilaterally  Heart: irregular rate and rhythm, S1, S2 normal, no murmur, click, rub or gallop  Abdomen: soft, non-tender; bowel sounds normal; no masses,  no organomegaly  Extremities: extremities normal, atraumatic, no cyanosis or edema  Pulses: 2+ and symmetric  Skin: Skin color, texture, turgor normal. No rashes or lesions  Neurologic: Grossly normal    Scheduled Meds:   metoprolol tartrate  25 mg Oral BID    thiamine  100 mg Oral Daily    sodium chloride flush  5-40 mL IntraVENous 2 times per day    enoxaparin  1 mg/kg (Order-Specific) SubCUTAneous BID    amoxicillin-clavulanate  1 tablet Oral 2 times per day    chlorhexidine  15 mL Mouth/Throat BID       Continuous Infusions:   dilTIAZem 5 mg/hr (07/17/25 0157)    sodium chloride         PRN Meds:  sodium chloride flush, sodium chloride, potassium chloride **OR** potassium alternative oral replacement **OR** potassium chloride, magnesium sulfate, ondansetron **OR** ondansetron, polyethylene glycol, acetaminophen **OR** acetaminophen, sulfur hexafluoride microspheres, diazePAM **OR** diazePAM **OR** diazePAM

## 2025-07-17 NOTE — ACP (ADVANCE CARE PLANNING)
Advance Care Planning     General Advance Care Planning (ACP) Conversation    Date of Conversation: 7/17/2025  Conducted with: Patient with Decision Making Capacity  Other persons present: None    Healthcare Decision Maker:   Primary Decision Maker: dianneemily - Spouse - [phone]       Content/Action Overview:  DECLINED ACP Conversation - will revisit periodically  Reviewed DNR/DNI and patient elects Full Code (Attempt Resuscitation)        Length of Voluntary ACP Conversation in minutes:  <16 minutes (Non-Billable)    Viktoriya Roca RN

## 2025-07-17 NOTE — CONSULTS
[facility]   CONSULTATION  [phone]        Reason for Consultation/Chief Complaint: \"I have been having chest pressure and palpitations.\"  Cardiology consulted for new onset  atrial fibrillation per DANIEL Cuevas  No cardiology note from past found    History of Present Illness:  Luis Jimenez is a 54 y.o. patient who presented to [facility] 7/16/25 with complaints of chest pressure and palpitations. Patient has been drinking heavy last two weeks. ON 7.15.25 he had 2 beers and a pint of Vodka.  Next morning woke up with pounding sensation in neck. He felt tightness in his neck.He came to emergency room and found to be in afib RVR. He is on diltiazem drip and rate is controlled but remains in afib.The patient's symptom onset is following heavy alcohol consumption. He says he is known to have panic attacks but this was different.  I have been asked to provide consultation regarding further management and testing.      Past Medical History:   has a past medical history of Acute pancreatitis and Pericarditis.    Surgical History:   has no past surgical history on file.     Social History:   reports that he has been smoking cigarettes. He has never used smokeless tobacco. He reports current alcohol use. He reports that he does not currently use drugs after having used the following drugs: Methamphetamines (Crystal Meth) and Marijuana (Weed).     Family History:  family history includes Cancer in his mother; No Known Problems in his father.     Home Medications:  Were reviewed and are listed in nursing record. and/or listed below  Prior to Admission medications    Medication Sig Start Date End Date Taking? Authorizing Provider   sildenafil (VIAGRA) 100 MG tablet Take 1 tablet by mouth daily as needed for Erectile Dysfunction  Patient not taking: Reported on 2/7/2023 12/6/22   Padmini Quinonez DO   escitalopram (LEXAPRO) 10 MG tablet Take 1 tablet by mouth daily  Patient not taking: Reported on 2/7/2023 9/2/22    Padmini Quinonez DO        Allergies:  Vicodin [hydrocodone-acetaminophen]     Review of Systems:   12 point ROS negative in all areas as listed below except as in [place]  Constitutional, EENT, pulmonary, GI, , Musculoskeletal, skin, neurological, hematological, endocrine, Psychiatric    Physical Examination:    Vitals:    07/17/25 0713   BP: 123/79   Pulse: 93   Resp: 18   Temp: 98.3 °F (36.8 °C)   SpO2: 96%    Weight - Scale: 97.4 kg (214 lb 12.8 oz)         General Appearance:  Alert, cooperative, no distress, appears stated age   Head:  Normocephalic, without obvious abnormality, atraumatic   Eyes:  PERRL, conjunctiva/corneas clear       Nose: Nares normal, no drainage or sinus tenderness   Throat: Lips, mucosa, and tongue normal   Neck: Supple, symmetrical, trachea midline, no adenopathy, thyroid: not enlarged, symmetric, no tenderness/mass/nodules, no carotid bruit or JVD       Lungs:   Clear to auscultation bilaterally, respirations unlabored   Chest Wall:  No tenderness or deformity   Heart:  Irreg irregular rate and rhythm, S2 normal, no murmur, rub or gallop   Abdomen:   Soft, non-tender, bowel sounds active all four quadrants,  no masses, no organomegaly           Extremities: Extremities normal, atraumatic, no cyanosis or edema   Pulses: 2+ and symmetric   Skin: Skin color, texture, turgor normal, no rashes or lesions   Pysch: Normal mood and affect   Neurologic: Normal gross motor and sensory exam.         Labs  CBC:   Lab Results   Component Value Date/Time    WBC 6.7 07/17/2025 05:00 AM    RBC 5.31 07/17/2025 05:00 AM    HGB 15.2 07/17/2025 05:00 AM    HCT 46.3 07/17/2025 05:00 AM    MCV 87.1 07/17/2025 05:00 AM    RDW 16.1 07/17/2025 05:00 AM     07/17/2025 05:00 AM     CMP:    Lab Results   Component Value Date/Time     07/17/2025 05:00 AM    K 3.8 07/17/2025 05:00 AM     07/17/2025 05:00 AM    CO2 28 07/17/2025 05:00 AM    BUN 10 07/17/2025 05:00 AM    CREATININE 0.8 07/17/2025

## 2025-07-17 NOTE — FLOWSHEET NOTE
07/16/25 2030   Vital Signs   Temp 98.8 °F (37.1 °C)   Temp Source Oral   Pulse (!) 101   Heart Rate Source Monitor   Respirations 18   /87   MAP (Calculated) 100   BP Location Right upper arm   BP Method Automatic   Patient Position Semi fowlers   Pain Assessment   Pain Assessment None - Denies Pain   Oxygen Therapy   SpO2 96 %   O2 Device None (Room air)

## 2025-07-17 NOTE — PROGRESS NOTES
Shift assessment complete see flow sheet respirations easy and even. Patient denies any needs at this time. Bed is locked in the lowest position with call light within reach.  Patient has been given tylenol for headache and valium per orders see MAR.

## 2025-07-17 NOTE — PROGRESS NOTES
Unable to assess pt CIWA score. Pt sleeping at this time. Resp e/e. No signs of distress noted. Call light within reach. Bed in lowest position. No additional needs at this time. Christy Dejesus RN

## 2025-07-17 NOTE — CARE COORDINATION
Case Management Assessment  Initial Evaluation    Date/Time of Evaluation: 7/17/2025 7:59 AM  Assessment Completed by: Viktoriya Roca RN    If patient is discharged prior to next notation, then this note serves as note for discharge by case management.    Patient Name: Luis Jimenez                   YOB: 1970  Diagnosis: Atrial fibrillation with RVR (HCC) [I48.91]  Atrial fibrillation, unspecified type (HCC) [I48.91]  Chest pain, unspecified type [R07.9]                   Date / Time: 7/16/2025 12:02 PM    Patient Admission Status: Inpatient   Readmission Risk (Low < 19, Mod (19-27), High > 27): Readmission Risk Score: 9.3    Current PCP: Rodolfo Gee, DO  PCP verified by CM? Yes    Chart Reviewed: Yes      History Provided by: Patient  Patient Orientation: Alert and Oriented    Patient Cognition: Alert    Hospitalization in the last 30 days (Readmission):  No    If yes, Readmission Assessment in CM Navigator will be completed.    Advance Directives:      Code Status: Full Code   Patient's Primary Decision Maker is: Legal Next of Kin    Primary Decision Maker: emily dean - Spouse - [phone]    Discharge Planning:    Patient lives with: Parent Type of Home: House  Primary Care Giver: Self  Patient Support Systems include: Spouse/Significant Other, Parent   Current Financial resources: Medicaid, Other (Comment) (DANIEL rivera)  Current community resources: None  Current services prior to admission: None            Current DME:              Type of Home Care services:  None    ADLS  Prior functional level: Independent in ADLs/IADLs  Current functional level: Independent in ADLs/IADLs    PT AM-PAC:   /24  OT AM-PAC:   /24    Family can provide assistance at DC: Yes  Would you like Case Management to discuss the discharge plan with any other family members/significant others, and if so, who? No  Plans to Return to Present Housing: Yes  Other Identified Issues/Barriers to RETURNING to current housing:

## 2025-07-18 ENCOUNTER — ANESTHESIA (OUTPATIENT)
Dept: ENDOSCOPY | Age: 55
End: 2025-07-18
Payer: COMMERCIAL

## 2025-07-18 ENCOUNTER — TELEPHONE (OUTPATIENT)
Dept: CARDIOLOGY CLINIC | Age: 55
End: 2025-07-18

## 2025-07-18 ENCOUNTER — APPOINTMENT (OUTPATIENT)
Dept: NUCLEAR MEDICINE | Age: 55
End: 2025-07-18
Attending: INTERNAL MEDICINE
Payer: COMMERCIAL

## 2025-07-18 ENCOUNTER — APPOINTMENT (OUTPATIENT)
Age: 55
End: 2025-07-18
Attending: INTERNAL MEDICINE
Payer: COMMERCIAL

## 2025-07-18 VITALS
RESPIRATION RATE: 16 BRPM | HEART RATE: 86 BPM | OXYGEN SATURATION: 96 % | TEMPERATURE: 97.9 F | SYSTOLIC BLOOD PRESSURE: 100 MMHG | WEIGHT: 217.5 LBS | DIASTOLIC BLOOD PRESSURE: 66 MMHG | HEIGHT: 77 IN | BODY MASS INDEX: 25.68 KG/M2

## 2025-07-18 LAB
ANION GAP SERPL CALCULATED.3IONS-SCNC: 13 MMOL/L (ref 3–16)
BASOPHILS # BLD: 0 K/UL (ref 0–0.2)
BASOPHILS NFR BLD: 0.6 %
BUN SERPL-MCNC: 15 MG/DL (ref 7–20)
CALCIUM SERPL-MCNC: 8.2 MG/DL (ref 8.3–10.6)
CHLORIDE SERPL-SCNC: 102 MMOL/L (ref 99–110)
CO2 SERPL-SCNC: 22 MMOL/L (ref 21–32)
CREAT SERPL-MCNC: 0.7 MG/DL (ref 0.9–1.3)
DEPRECATED RDW RBC AUTO: 15.9 % (ref 12.4–15.4)
ECHO BSA: 2.3 M2
EKG ATRIAL RATE: 86 BPM
EKG DIAGNOSIS: NORMAL
EKG P AXIS: 43 DEGREES
EKG P-R INTERVAL: 162 MS
EKG Q-T INTERVAL: 364 MS
EKG QRS DURATION: 80 MS
EKG QTC CALCULATION (BAZETT): 435 MS
EKG R AXIS: 29 DEGREES
EKG T AXIS: 36 DEGREES
EKG VENTRICULAR RATE: 86 BPM
EOSINOPHIL # BLD: 0.1 K/UL (ref 0–0.6)
EOSINOPHIL NFR BLD: 1.3 %
GFR SERPLBLD CREATININE-BSD FMLA CKD-EPI: >90 ML/MIN/{1.73_M2}
GLUCOSE SERPL-MCNC: 111 MG/DL (ref 70–99)
HCT VFR BLD AUTO: 46 % (ref 40.5–52.5)
HGB BLD-MCNC: 15.3 G/DL (ref 13.5–17.5)
LYMPHOCYTES # BLD: 2.1 K/UL (ref 1–5.1)
LYMPHOCYTES NFR BLD: 28.3 %
MCH RBC QN AUTO: 28.9 PG (ref 26–34)
MCHC RBC AUTO-ENTMCNC: 33.1 G/DL (ref 31–36)
MCV RBC AUTO: 87.3 FL (ref 80–100)
MONOCYTES # BLD: 0.6 K/UL (ref 0–1.3)
MONOCYTES NFR BLD: 7.9 %
NEUTROPHILS # BLD: 4.5 K/UL (ref 1.7–7.7)
NEUTROPHILS NFR BLD: 61.9 %
NUC STRESS EJECTION FRACTION: 65 %
NUC STRESS LV EDV: 122 ML (ref 67–155)
NUC STRESS LV ESV: 43 ML (ref 22–58)
NUC STRESS LV MASS: 154 G
PLATELET # BLD AUTO: 137 K/UL (ref 135–450)
PMV BLD AUTO: 9.8 FL (ref 5–10.5)
POTASSIUM SERPL-SCNC: 3.8 MMOL/L (ref 3.5–5.1)
POTASSIUM SERPL-SCNC: 3.8 MMOL/L (ref 3.5–5.1)
RBC # BLD AUTO: 5.27 M/UL (ref 4.2–5.9)
SODIUM SERPL-SCNC: 137 MMOL/L (ref 136–145)
STRESS BASELINE DIAS BP: 80 MMHG
STRESS BASELINE HR: 61 BPM
STRESS BASELINE SYS BP: 126 MMHG
STRESS ESTIMATED WORKLOAD: 1 METS
STRESS PEAK DIAS BP: 80 MMHG
STRESS PEAK SYS BP: 126 MMHG
STRESS PERCENT HR ACHIEVED: 61 %
STRESS POST PEAK HR: 102 BPM
STRESS RATE PRESSURE PRODUCT: NORMAL BPM*MMHG
STRESS TARGET HR: 168 BPM
WBC # BLD AUTO: 7.4 K/UL (ref 4–11)

## 2025-07-18 PROCEDURE — 93010 ELECTROCARDIOGRAM REPORT: CPT | Performed by: INTERNAL MEDICINE

## 2025-07-18 PROCEDURE — 2709999900 HC NON-CHARGEABLE SUPPLY: Performed by: INTERNAL MEDICINE

## 2025-07-18 PROCEDURE — 85025 COMPLETE CBC W/AUTO DIFF WBC: CPT

## 2025-07-18 PROCEDURE — 3700000000 HC ANESTHESIA ATTENDED CARE: Performed by: INTERNAL MEDICINE

## 2025-07-18 PROCEDURE — 6370000000 HC RX 637 (ALT 250 FOR IP): Performed by: INTERNAL MEDICINE

## 2025-07-18 PROCEDURE — 78452 HT MUSCLE IMAGE SPECT MULT: CPT | Performed by: INTERNAL MEDICINE

## 2025-07-18 PROCEDURE — 78452 HT MUSCLE IMAGE SPECT MULT: CPT

## 2025-07-18 PROCEDURE — 3700000001 HC ADD 15 MINUTES (ANESTHESIA): Performed by: INTERNAL MEDICINE

## 2025-07-18 PROCEDURE — 6360000002 HC RX W HCPCS: Performed by: INTERNAL MEDICINE

## 2025-07-18 PROCEDURE — 2580000003 HC RX 258: Performed by: NURSE ANESTHETIST, CERTIFIED REGISTERED

## 2025-07-18 PROCEDURE — 93005 ELECTROCARDIOGRAM TRACING: CPT | Performed by: INTERNAL MEDICINE

## 2025-07-18 PROCEDURE — 93018 CV STRESS TEST I&R ONLY: CPT | Performed by: INTERNAL MEDICINE

## 2025-07-18 PROCEDURE — A9502 TC99M TETROFOSMIN: HCPCS | Performed by: INTERNAL MEDICINE

## 2025-07-18 PROCEDURE — 7100000010 HC PHASE II RECOVERY - FIRST 15 MIN: Performed by: INTERNAL MEDICINE

## 2025-07-18 PROCEDURE — 5A2204Z RESTORATION OF CARDIAC RHYTHM, SINGLE: ICD-10-PCS | Performed by: INTERNAL MEDICINE

## 2025-07-18 PROCEDURE — 7100000011 HC PHASE II RECOVERY - ADDTL 15 MIN: Performed by: INTERNAL MEDICINE

## 2025-07-18 PROCEDURE — 6370000000 HC RX 637 (ALT 250 FOR IP)

## 2025-07-18 PROCEDURE — 6360000002 HC RX W HCPCS: Performed by: NURSE ANESTHETIST, CERTIFIED REGISTERED

## 2025-07-18 PROCEDURE — 36415 COLL VENOUS BLD VENIPUNCTURE: CPT

## 2025-07-18 PROCEDURE — 93017 CV STRESS TEST TRACING ONLY: CPT

## 2025-07-18 PROCEDURE — 92960 CARDIOVERSION ELECTRIC EXT: CPT | Performed by: INTERNAL MEDICINE

## 2025-07-18 PROCEDURE — 93016 CV STRESS TEST SUPVJ ONLY: CPT | Performed by: INTERNAL MEDICINE

## 2025-07-18 PROCEDURE — 3430000000 HC RX DIAGNOSTIC RADIOPHARMACEUTICAL: Performed by: INTERNAL MEDICINE

## 2025-07-18 PROCEDURE — 80048 BASIC METABOLIC PNL TOTAL CA: CPT

## 2025-07-18 PROCEDURE — 99238 HOSP IP/OBS DSCHRG MGMT 30/<: CPT | Performed by: INTERNAL MEDICINE

## 2025-07-18 PROCEDURE — 6360000002 HC RX W HCPCS

## 2025-07-18 RX ORDER — LANOLIN ALCOHOL/MO/W.PET/CERES
100 CREAM (GRAM) TOPICAL DAILY
Qty: 30 TABLET | Refills: 3 | Status: SHIPPED | OUTPATIENT
Start: 2025-07-19 | End: 2025-07-25 | Stop reason: SDUPTHER

## 2025-07-18 RX ORDER — SODIUM CHLORIDE 0.9 % (FLUSH) 0.9 %
5-40 SYRINGE (ML) INJECTION EVERY 12 HOURS SCHEDULED
OUTPATIENT
Start: 2025-07-18

## 2025-07-18 RX ORDER — PROPOFOL 10 MG/ML
INJECTION, EMULSION INTRAVENOUS
Status: DISCONTINUED | OUTPATIENT
Start: 2025-07-18 | End: 2025-07-18 | Stop reason: SDUPTHER

## 2025-07-18 RX ORDER — SODIUM CHLORIDE 0.9 % (FLUSH) 0.9 %
5-40 SYRINGE (ML) INJECTION PRN
Status: CANCELLED | OUTPATIENT
Start: 2025-07-18

## 2025-07-18 RX ORDER — OXYCODONE HYDROCHLORIDE 5 MG/1
10 TABLET ORAL PRN
Refills: 0 | Status: CANCELLED | OUTPATIENT
Start: 2025-07-18 | End: 2025-07-18

## 2025-07-18 RX ORDER — METOPROLOL TARTRATE 25 MG/1
25 TABLET, FILM COATED ORAL 2 TIMES DAILY
Qty: 60 TABLET | Refills: 3 | Status: SHIPPED | OUTPATIENT
Start: 2025-07-18 | End: 2025-07-25 | Stop reason: SDUPTHER

## 2025-07-18 RX ORDER — SODIUM CHLORIDE 9 MG/ML
INJECTION, SOLUTION INTRAVENOUS PRN
OUTPATIENT
Start: 2025-07-18

## 2025-07-18 RX ORDER — LABETALOL HYDROCHLORIDE 5 MG/ML
10 INJECTION, SOLUTION INTRAVENOUS
Status: CANCELLED | OUTPATIENT
Start: 2025-07-18

## 2025-07-18 RX ORDER — DIPHENHYDRAMINE HYDROCHLORIDE 50 MG/ML
12.5 INJECTION, SOLUTION INTRAMUSCULAR; INTRAVENOUS
Status: CANCELLED | OUTPATIENT
Start: 2025-07-18

## 2025-07-18 RX ORDER — SODIUM CHLORIDE, SODIUM LACTATE, POTASSIUM CHLORIDE, CALCIUM CHLORIDE 600; 310; 30; 20 MG/100ML; MG/100ML; MG/100ML; MG/100ML
INJECTION, SOLUTION INTRAVENOUS
Status: DISCONTINUED | OUTPATIENT
Start: 2025-07-18 | End: 2025-07-18 | Stop reason: SDUPTHER

## 2025-07-18 RX ORDER — LIDOCAINE HYDROCHLORIDE 20 MG/ML
INJECTION, SOLUTION INFILTRATION; PERINEURAL
Status: DISCONTINUED | OUTPATIENT
Start: 2025-07-18 | End: 2025-07-18 | Stop reason: SDUPTHER

## 2025-07-18 RX ORDER — CHLORHEXIDINE GLUCONATE ORAL RINSE 1.2 MG/ML
15 SOLUTION DENTAL 2 TIMES DAILY
Qty: 420 ML | Refills: 0 | Status: CANCELLED | OUTPATIENT
Start: 2025-07-18 | End: 2025-08-01

## 2025-07-18 RX ORDER — OXYCODONE HYDROCHLORIDE 5 MG/1
5 TABLET ORAL PRN
Refills: 0 | Status: CANCELLED | OUTPATIENT
Start: 2025-07-18 | End: 2025-07-18

## 2025-07-18 RX ORDER — SODIUM CHLORIDE 0.9 % (FLUSH) 0.9 %
5-40 SYRINGE (ML) INJECTION PRN
OUTPATIENT
Start: 2025-07-18

## 2025-07-18 RX ORDER — MEPERIDINE HYDROCHLORIDE 25 MG/ML
12.5 INJECTION INTRAMUSCULAR; INTRAVENOUS; SUBCUTANEOUS EVERY 5 MIN PRN
Refills: 0 | Status: CANCELLED | OUTPATIENT
Start: 2025-07-18

## 2025-07-18 RX ORDER — ONDANSETRON 2 MG/ML
4 INJECTION INTRAMUSCULAR; INTRAVENOUS
Status: CANCELLED | OUTPATIENT
Start: 2025-07-18

## 2025-07-18 RX ORDER — REGADENOSON 0.08 MG/ML
0.4 INJECTION, SOLUTION INTRAVENOUS
Status: COMPLETED | OUTPATIENT
Start: 2025-07-18 | End: 2025-07-18

## 2025-07-18 RX ORDER — SODIUM CHLORIDE 9 MG/ML
INJECTION, SOLUTION INTRAVENOUS PRN
Status: CANCELLED | OUTPATIENT
Start: 2025-07-18

## 2025-07-18 RX ORDER — SODIUM CHLORIDE 0.9 % (FLUSH) 0.9 %
5-40 SYRINGE (ML) INJECTION EVERY 12 HOURS SCHEDULED
Status: CANCELLED | OUTPATIENT
Start: 2025-07-18

## 2025-07-18 RX ADMIN — AMOXICILLIN AND CLAVULANATE POTASSIUM 1 TABLET: 875; 125 TABLET, FILM COATED ORAL at 08:56

## 2025-07-18 RX ADMIN — ENOXAPARIN SODIUM 100 MG: 100 INJECTION SUBCUTANEOUS at 08:56

## 2025-07-18 RX ADMIN — TETROFOSMIN 11 MILLICURIE: 1.38 INJECTION, POWDER, LYOPHILIZED, FOR SOLUTION INTRAVENOUS at 10:09

## 2025-07-18 RX ADMIN — SODIUM CHLORIDE, POTASSIUM CHLORIDE, SODIUM LACTATE AND CALCIUM CHLORIDE: 600; 310; 30; 20 INJECTION, SOLUTION INTRAVENOUS at 07:37

## 2025-07-18 RX ADMIN — TETROFOSMIN 32 MILLICURIE: 1.38 INJECTION, POWDER, LYOPHILIZED, FOR SOLUTION INTRAVENOUS at 11:24

## 2025-07-18 RX ADMIN — METOPROLOL TARTRATE 25 MG: 25 TABLET, FILM COATED ORAL at 08:56

## 2025-07-18 RX ADMIN — Medication 100 MG: at 08:56

## 2025-07-18 RX ADMIN — REGADENOSON 0.4 MG: 0.08 INJECTION, SOLUTION INTRAVENOUS at 11:24

## 2025-07-18 RX ADMIN — PROPOFOL 200 MG: 10 INJECTION, EMULSION INTRAVENOUS at 07:46

## 2025-07-18 RX ADMIN — CHLORHEXIDINE GLUCONATE 0.12% ORAL RINSE 15 ML: 1.2 LIQUID ORAL at 08:56

## 2025-07-18 RX ADMIN — LIDOCAINE HYDROCHLORIDE 60 MG: 20 INJECTION, SOLUTION INFILTRATION; PERINEURAL at 07:46

## 2025-07-18 ASSESSMENT — LIFESTYLE VARIABLES: SMOKING_STATUS: 1

## 2025-07-18 NOTE — FLOWSHEET NOTE
07/18/25 1253   Vital Signs   Temp 97.9 °F (36.6 °C)   Temp Source Oral   Pulse 86   Heart Rate Source Monitor   Respirations 16   /66   MAP (Calculated) 77   BP Location Left upper arm   BP Method Automatic   Patient Position Semi fowlers   Oxygen Therapy   SpO2 96 %   O2 Device None (Room air)     Patient remains stable. Call light within reach.

## 2025-07-18 NOTE — ANESTHESIA PRE PROCEDURE
Department of Anesthesiology  Preprocedure Note       Name:  Luis Jimenez   Age:  54 y.o.  :  1970                                          MRN:  [id number]         Date:  2025      Surgeon: Surgeon(s):  Chuckie Wilder MD    Procedure: Procedure(s):  CARDIOVERSION/ANESTHESIA    Medications prior to admission:   Prior to Admission medications    Medication Sig Start Date End Date Taking? Authorizing Provider   sildenafil (VIAGRA) 100 MG tablet Take 1 tablet by mouth daily as needed for Erectile Dysfunction  Patient not taking: Reported on 2025   Padmini Quinonez DO   escitalopram (LEXAPRO) 10 MG tablet Take 1 tablet by mouth daily  Patient not taking: Reported on 2025   Padmini Quinonez DO       Current medications:    Current Facility-Administered Medications   Medication Dose Route Frequency Provider Last Rate Last Admin   • metoprolol tartrate (LOPRESSOR) tablet 25 mg  25 mg Oral BID Chuckie Wilder MD   25 mg at 25   • thiamine tablet 100 mg  100 mg Oral Daily Chuckie Wilder MD   100 mg at 25 1338   • dilTIAZem 125 mg in sodium chloride 0.9 % 125 mL infusion  5 mg/hr IntraVENous Continuous Kay Gonzales PA-C 5 mL/hr at 25 5 mg/hr at 25   • sodium chloride flush 0.9 % injection 5-40 mL  5-40 mL IntraVENous 2 times per day Kay Gonzales PA-C   10 mL at 25 0847   • sodium chloride flush 0.9 % injection 10 mL  10 mL IntraVENous PRN Kay Gonzales PA-C       • 0.9 % sodium chloride infusion   IntraVENous PRN Kay Gonzales PA-C       • potassium chloride (KLOR-CON M) extended release tablet 40 mEq  40 mEq Oral PRN Kay Gonzales PA-C        Or   • potassium bicarb-citric acid (EFFER-K) effervescent tablet 40 mEq  40 mEq Oral PRN Kay Gonzales PA-C        Or   • potassium chloride 10 mEq/100 mL IVPB (Peripheral Line)  10 mEq IntraVENous PRN Kay Gonzales PA-C       • magnesium sulfate 2000 mg in 50 mL IVPB

## 2025-07-18 NOTE — ANESTHESIA POSTPROCEDURE EVALUATION
Department of Anesthesiology  Postprocedure Note    Patient: Luis Jimenez  MRN: [id number]  YOB: 1970  Date of evaluation: 7/18/2025    Procedure Summary       Date: 07/18/25 Room / Location: [address]    Anesthesia Start: 0740 Anesthesia Stop: 0805    Procedure: CARDIOVERSION/ANESTHESIA Diagnosis:       Atrial fibrillation, unspecified type (HCC)      (Atrial fibrillation, unspecified type (HCC) [I48.91])    Surgeons: Chuckie Wilder MD Responsible Provider: Reece Cronin MD    Anesthesia Type: MAC ASA Status: 3            Anesthesia Type: No value filed.    Dolly Phase I: Dolly Score: 10    Dolly Phase II: Dolly Score: 10    Anesthesia Post Evaluation    Patient location during evaluation: PACU  Patient participation: complete - patient participated  Level of consciousness: awake and alert  Airway patency: patent  Nausea & Vomiting: no nausea and no vomiting  Cardiovascular status: blood pressure returned to baseline  Respiratory status: acceptable  Hydration status: euvolemic  Comments: VSS on transfer to phase 2 recovery.  No anesthetic complications.  Pain management: adequate    No notable events documented.

## 2025-07-18 NOTE — FLOWSHEET NOTE
07/17/25 2245   Vital Signs   Temp 98.3 °F (36.8 °C)   Temp Source Oral   Pulse 92   Heart Rate Source Monitor   Respirations 18   /69   MAP (Calculated) 92   BP Location Right upper arm   BP Method Automatic   Oxygen Therapy   SpO2 96 %   O2 Device None (Room air)     Pt reassessment complete. No changes noted.  Pt NPO at this time. Call light within reach.

## 2025-07-18 NOTE — DISCHARGE SUMMARY
Name:  Luis Jimenez  Room:  /0313-01  MRN:    [id number]    Discharge Summary      This discharge summary is in conjunction with a complete physical exam done on the day of discharge.    Attending Physician: Dr. Britt  Discharging Physician: Dr. Britt      Admit: 7/16/2025  Discharge:  7/18/2025    HPI:    The patient is a 54 y.o. male with PMHx of substance/ alcohol abuse and  mood disorder who presents to [facility] for chest pressure and palpitations. He states that last night he believed he was having a panic attack when he was cooking dinner and felt intense chest pressure. He started drinking alcohol thinking that would help. This morning he started feeling severe neck and chest pressure with shortness of breath and palpitations. He believes the chest pain was secondary to his increased/ rapid heartbeat. He felt dizzy and nauseous. He then tried drinking more alcohol to see if that would help. He also states he recently went to urgent care for a dental infection 2 days ago when he was started on bactrim and keflex.   He does have a history of alcohol abuse and started drinking again about a month ago when a loved one passed away. He has been drinking about a pint of vodka a day since then. He has not been using drugs again, other than occasional marijuana. He does endorse vaping. History obtained from the patient and review of EMR.         Diagnoses this Admission and Hospital Course     #New onset atrial fibrillation with RVR  -endorse CP, sob, and palpitations on arrival  -initial HR in 140s/50s  -[id number] Score for Atrial Fibrillation Stroke Risk    Risk   Factors   Component Value   C CHF No 0   H HTN No 0   A2 Age >= 75 No,  (54 y.o.) 0   D DM No 0   S2 Prior Stroke/TIA No 0   V Vascular Disease No 0   A Age 65-74 No,  (54 y.o.) 0   Sc Sex male 0     [id number]  Score   0   -started on therapeutic lovenox-> continued AC pending cardiology recommendations; score of 0 as above  not enlarged, symmetric, no tenderness/mass/nodules  Lungs: clear to auscultation bilaterally  Heart: regular rate and rhythm, S1, S2 normal, no murmur, click, rub or gallop  Abdomen: soft, non-tender; bowel sounds normal; no masses,  no organomegaly  Extremities: extremities normal, atraumatic, no cyanosis or edema  Pulses: 2+ and symmetric  Skin: Skin color, texture, turgor normal. No rashes or lesions  Neurologic: Grossly normal    CBC:   Recent Labs     07/16/25  1229 07/17/25  0500 07/18/25  0414   WBC 6.1 6.7 7.4   HGB 15.5 15.2 15.3   HCT 46.2 46.3 46.0   MCV 85.4 87.1 87.3    152 137     BMP:   Recent Labs     07/16/25  1229 07/16/25  1321 07/17/25  0500 07/18/25  0414     --  139 137   K 3.8   < > 3.8 3.8  3.8   CL 98*  --  100 102   CO2 20*  --  28 22   BUN 9  --  10 15   CREATININE 0.8*  --  0.8* 0.7*    < > = values in this interval not displayed.     LIVER PROFILE:   Recent Labs     07/16/25  1229   AST 59*   ALT 49*   BILITOT 0.5   ALKPHOS 90     PT/INR:   Recent Labs     07/17/25  0500   PROTIME 13.6   INR 1.01         U/A:    Lab Results   Component Value Date/Time    COLORU Yellow 09/13/2016 05:50 PM    WBCUA 3-5 09/02/2015 11:20 AM    RBCUA None seen 09/02/2015 11:20 AM    MUCUS Rare 09/02/2015 11:20 AM    CLARITYU Clear 09/13/2016 05:50 PM    LEUKOCYTESUR Negative 09/13/2016 05:50 PM    BLOODU Negative 09/13/2016 05:50 PM    GLUCOSEU Negative 09/13/2016 05:50 PM         CULTURES  Results       ** No results found for the last 336 hours. **            RADIOLOGY  XR CHEST PORTABLE   Final Result   No acute cardiopulmonary findings           Stress test 7/18/25      Stress Combined Conclusion: Normal myocardial perfusion study. Evidence for diaphragm attenuation artifact. LV function normal EF 57% with uniform wall motion. Low risk study.    Stress Test: A pharmacological stress test was performed using regadenoson (Lexiscan). PO caffeine given as a reversal agent.    Resting ECG: NSR

## 2025-07-18 NOTE — OP NOTE
OP NOTE  Procedure   Direct current external cardioversion  Indication atrial fibrillations  Sedation propofol by anesthesia dept.  Rhythm was synchronized to defibrillator  200 joules of biphasic current was applied in anterior and posterior position.  First shock did not convert to NSR  Second shock converted him to NSR  Vitals normal  Neuro exam  normal post cardioversion.  Diltiazem drip discontinued.  He is on PO metoprolol 25mg BID, BP systolic 100-110  May adjust the dose to higher level later today  If BP allows.  Stress test this am.

## 2025-07-18 NOTE — PLAN OF CARE
Problem: Discharge Planning  Goal: Discharge to home or other facility with appropriate resources  Outcome: Progressing     Problem: Pain  Goal: Verbalizes/displays adequate comfort level or baseline comfort level  Outcome: Progressing     Problem: Safety - Adult  Goal: Free from fall injury  Outcome: Progressing     Problem: Cardiovascular - Adult  Goal: Maintains optimal cardiac output and hemodynamic stability  Outcome: Progressing  Goal: Absence of cardiac dysrhythmias or at baseline  Outcome: Progressing     Problem: Cardiovascular - Adult  Goal: Absence of cardiac dysrhythmias or at baseline  Outcome: Progressing

## 2025-07-18 NOTE — FLOWSHEET NOTE
07/18/25 0830   Vital Signs   Temp 97.5 °F (36.4 °C)   Temp Source Oral   Pulse 86   Heart Rate Source Monitor   Respirations 16   /77   MAP (Calculated) 92   BP Location Left upper arm   BP Method Automatic   Patient Position Semi fowlers   Oxygen Therapy   SpO2 98 %   O2 Device None (Room air)     Shift assessment completed. See flow sheet. Medications given. Patient is given. Patient is A&O x4. Vitals are stable. Patient just returned from [facility]. Patient denies further needs. Call light within reach.

## 2025-07-18 NOTE — FLOWSHEET NOTE
07/17/25 1915   Vital Signs   Temp 98.1 °F (36.7 °C)   Temp Source Oral   Pulse 92   Heart Rate Source Monitor   Respirations 18   /68   MAP (Calculated) 84   BP Location Right upper arm   BP Method Automatic   Patient Position Supine   Oxygen Therapy   SpO2 97 %   O2 Device None (Room air)     Pt assessment complete.  Pt lying in bed quietly.  Lung sounds clear.  Pt remains in afib with HR in the 90's.  Cardizem gtt infusing at 5ml/hr.  Nightly medications given.  Pt to be NPO for cardioversion and stress test in am.  Pt verbalizes understanding.  Denies needs at this time.  Call light within reach.

## 2025-07-22 ENCOUNTER — TELEPHONE (OUTPATIENT)
Dept: PRIMARY CARE CLINIC | Age: 55
End: 2025-07-22

## 2025-07-24 SDOH — ECONOMIC STABILITY: INCOME INSECURITY: IN THE LAST 12 MONTHS, WAS THERE A TIME WHEN YOU WERE NOT ABLE TO PAY THE MORTGAGE OR RENT ON TIME?: YES

## 2025-07-24 SDOH — ECONOMIC STABILITY: FOOD INSECURITY: WITHIN THE PAST 12 MONTHS, THE FOOD YOU BOUGHT JUST DIDN'T LAST AND YOU DIDN'T HAVE MONEY TO GET MORE.: SOMETIMES TRUE

## 2025-07-24 SDOH — ECONOMIC STABILITY: TRANSPORTATION INSECURITY
IN THE PAST 12 MONTHS, HAS LACK OF TRANSPORTATION KEPT YOU FROM MEETINGS, WORK, OR FROM GETTING THINGS NEEDED FOR DAILY LIVING?: YES

## 2025-07-24 SDOH — ECONOMIC STABILITY: FOOD INSECURITY: WITHIN THE PAST 12 MONTHS, YOU WORRIED THAT YOUR FOOD WOULD RUN OUT BEFORE YOU GOT MONEY TO BUY MORE.: OFTEN TRUE

## 2025-07-24 SDOH — ECONOMIC STABILITY: TRANSPORTATION INSECURITY
IN THE PAST 12 MONTHS, HAS THE LACK OF TRANSPORTATION KEPT YOU FROM MEDICAL APPOINTMENTS OR FROM GETTING MEDICATIONS?: YES

## 2025-07-25 ENCOUNTER — OFFICE VISIT (OUTPATIENT)
Dept: PRIMARY CARE CLINIC | Age: 55
End: 2025-07-25

## 2025-07-25 VITALS
OXYGEN SATURATION: 97 % | HEIGHT: 75 IN | SYSTOLIC BLOOD PRESSURE: 136 MMHG | BODY MASS INDEX: 27.13 KG/M2 | DIASTOLIC BLOOD PRESSURE: 82 MMHG | WEIGHT: 218.2 LBS | HEART RATE: 71 BPM

## 2025-07-25 DIAGNOSIS — K04.7 DENTAL INFECTION: ICD-10-CM

## 2025-07-25 DIAGNOSIS — I48.91 ATRIAL FIBRILLATION WITH RVR (HCC): Primary | ICD-10-CM

## 2025-07-25 DIAGNOSIS — Z09 HOSPITAL DISCHARGE FOLLOW-UP: ICD-10-CM

## 2025-07-25 DIAGNOSIS — K42.9 UMBILICAL HERNIA WITHOUT OBSTRUCTION AND WITHOUT GANGRENE: ICD-10-CM

## 2025-07-25 DIAGNOSIS — F10.10 ALCOHOL ABUSE: ICD-10-CM

## 2025-07-25 DIAGNOSIS — F39 MOOD DISORDER: ICD-10-CM

## 2025-07-25 RX ORDER — METOPROLOL TARTRATE 25 MG/1
25 TABLET, FILM COATED ORAL 2 TIMES DAILY
Qty: 180 TABLET | Refills: 0 | Status: SHIPPED | OUTPATIENT
Start: 2025-07-25

## 2025-07-25 RX ORDER — LANOLIN ALCOHOL/MO/W.PET/CERES
100 CREAM (GRAM) TOPICAL DAILY
Qty: 90 TABLET | Refills: 0 | Status: SHIPPED | OUTPATIENT
Start: 2025-07-25

## 2025-07-25 RX ORDER — CEPHALEXIN 500 MG/1
500 CAPSULE ORAL 4 TIMES DAILY
COMMUNITY

## 2025-07-25 SDOH — SOCIAL STABILITY: SOCIAL INSECURITY: WITHIN THE LAST YEAR, HAVE YOU BEEN AFRAID OF YOUR PARTNER OR EX-PARTNER?: NO

## 2025-07-25 SDOH — HEALTH STABILITY: MENTAL HEALTH: HOW OFTEN DO YOU HAVE A DRINK CONTAINING ALCOHOL?: 4 OR MORE TIMES A WEEK

## 2025-07-25 SDOH — SOCIAL STABILITY: SOCIAL INSECURITY
WITHIN THE LAST YEAR, HAVE YOU BEEN KICKED, HIT, SLAPPED, OR OTHERWISE PHYSICALLY HURT BY YOUR PARTNER OR EX-PARTNER?: NO

## 2025-07-25 SDOH — ECONOMIC STABILITY: HOUSING INSECURITY: IN THE LAST 12 MONTHS, WAS THERE A TIME WHEN YOU WERE NOT ABLE TO PAY THE MORTGAGE OR RENT ON TIME?: YES

## 2025-07-25 SDOH — SOCIAL STABILITY: SOCIAL INSECURITY: ARE YOU MARRIED, WIDOWED, DIVORCED, SEPARATED, NEVER MARRIED, OR LIVING WITH A PARTNER?: DIVORCED

## 2025-07-25 SDOH — ECONOMIC STABILITY: FOOD INSECURITY: WITHIN THE PAST 12 MONTHS, THE FOOD YOU BOUGHT JUST DIDN'T LAST AND YOU DIDN'T HAVE MONEY TO GET MORE.: SOMETIMES TRUE

## 2025-07-25 SDOH — HEALTH STABILITY: MENTAL HEALTH: HOW MANY DRINKS CONTAINING ALCOHOL DO YOU HAVE ON A TYPICAL DAY WHEN YOU ARE DRINKING?: 5 OR 6

## 2025-07-25 SDOH — SOCIAL STABILITY: SOCIAL NETWORK
IN A TYPICAL WEEK, HOW MANY TIMES DO YOU TALK ON THE PHONE WITH FAMILY, FRIENDS, OR NEIGHBORS?: MORE THAN THREE TIMES A WEEK

## 2025-07-25 SDOH — SOCIAL STABILITY: SOCIAL INSECURITY: WITHIN THE LAST YEAR, HAVE YOU BEEN HUMILIATED OR EMOTIONALLY ABUSED IN OTHER WAYS BY YOUR PARTNER OR EX-PARTNER?: NO

## 2025-07-25 SDOH — SOCIAL STABILITY: SOCIAL NETWORK: HOW OFTEN DO YOU GET TOGETHER WITH FRIENDS OR RELATIVES?: THREE TIMES A WEEK

## 2025-07-25 SDOH — HEALTH STABILITY: MENTAL HEALTH
DO YOU FEEL STRESS - TENSE, RESTLESS, NERVOUS, OR ANXIOUS, OR UNABLE TO SLEEP AT NIGHT BECAUSE YOUR MIND IS TROUBLED ALL THE TIME - THESE DAYS?: VERY MUCH

## 2025-07-25 SDOH — SOCIAL STABILITY: SOCIAL NETWORK
DO YOU BELONG TO ANY CLUBS OR ORGANIZATIONS SUCH AS CHURCH GROUPS, UNIONS, FRATERNAL OR ATHLETIC GROUPS, OR SCHOOL GROUPS?: NO

## 2025-07-25 SDOH — SOCIAL STABILITY: SOCIAL NETWORK: HOW OFTEN DO YOU ATTEND MEETINGS OF THE CLUBS OR ORGANIZATIONS YOU BELONG TO?: NEVER

## 2025-07-25 SDOH — SOCIAL STABILITY: SOCIAL NETWORK: HOW OFTEN DO YOU ATTEND CHURCH OR RELIGIOUS SERVICES?: 1 TO 4 TIMES PER YEAR

## 2025-07-25 SDOH — ECONOMIC STABILITY: FOOD INSECURITY: HOW HARD IS IT FOR YOU TO PAY FOR THE VERY BASICS LIKE FOOD, HOUSING, MEDICAL CARE, AND HEATING?: VERY HARD

## 2025-07-25 SDOH — ECONOMIC STABILITY: FOOD INSECURITY
WITHIN THE PAST 12 MONTHS, YOU WORRIED THAT YOUR FOOD WOULD RUN OUT BEFORE YOU GOT THE MONEY TO BUY MORE.: SOMETIMES TRUE

## 2025-07-25 SDOH — SOCIAL STABILITY: SOCIAL INSECURITY
WITHIN THE LAST YEAR, HAVE YOU BEEN RAPED OR FORCED TO HAVE ANY KIND OF SEXUAL ACTIVITY BY YOUR PARTNER OR EX-PARTNER?: NO

## 2025-07-25 SDOH — HEALTH STABILITY: PHYSICAL HEALTH: ON AVERAGE, HOW MANY MINUTES DO YOU ENGAGE IN EXERCISE AT THIS LEVEL?: 100 MIN

## 2025-07-25 SDOH — ECONOMIC STABILITY: TRANSPORTATION INSECURITY: IN THE PAST 12 MONTHS, HAS LACK OF TRANSPORTATION KEPT YOU FROM MEDICAL APPOINTMENTS OR FROM GETTING MEDICATIONS?: NO

## 2025-07-25 SDOH — HEALTH STABILITY: PHYSICAL HEALTH: ON AVERAGE, HOW MANY DAYS PER WEEK DO YOU ENGAGE IN MODERATE TO STRENUOUS EXERCISE (LIKE A BRISK WALK)?: 3 DAYS

## 2025-07-25 ASSESSMENT — PATIENT HEALTH QUESTIONNAIRE - PHQ9
SUM OF ALL RESPONSES TO PHQ QUESTIONS 1-9: 17
SUM OF ALL RESPONSES TO PHQ QUESTIONS 1-9: 17
3. TROUBLE FALLING OR STAYING ASLEEP: NEARLY EVERY DAY
9. THOUGHTS THAT YOU WOULD BE BETTER OFF DEAD, OR OF HURTING YOURSELF: NOT AT ALL
1. LITTLE INTEREST OR PLEASURE IN DOING THINGS: SEVERAL DAYS
7. TROUBLE CONCENTRATING ON THINGS, SUCH AS READING THE NEWSPAPER OR WATCHING TELEVISION: NEARLY EVERY DAY
3. TROUBLE FALLING OR STAYING ASLEEP: SEVERAL DAYS
1. LITTLE INTEREST OR PLEASURE IN DOING THINGS: NEARLY EVERY DAY
2. FEELING DOWN, DEPRESSED OR HOPELESS: NEARLY EVERY DAY
8. MOVING OR SPEAKING SO SLOWLY THAT OTHER PEOPLE COULD HAVE NOTICED. OR THE OPPOSITE, BEING SO FIGETY OR RESTLESS THAT YOU HAVE BEEN MOVING AROUND A LOT MORE THAN USUAL: SEVERAL DAYS
SUM OF ALL RESPONSES TO PHQ QUESTIONS 1-9: 17
SUM OF ALL RESPONSES TO PHQ QUESTIONS 1-9: 8
5. POOR APPETITE OR OVEREATING: MORE THAN HALF THE DAYS
6. FEELING BAD ABOUT YOURSELF - OR THAT YOU ARE A FAILURE OR HAVE LET YOURSELF OR YOUR FAMILY DOWN: NOT AT ALL
SUM OF ALL RESPONSES TO PHQ QUESTIONS 1-9: 8
9. THOUGHTS THAT YOU WOULD BE BETTER OFF DEAD, OR OF HURTING YOURSELF: NOT AT ALL
2. FEELING DOWN, DEPRESSED OR HOPELESS: SEVERAL DAYS
SUM OF ALL RESPONSES TO PHQ QUESTIONS 1-9: 8
10. IF YOU CHECKED OFF ANY PROBLEMS, HOW DIFFICULT HAVE THESE PROBLEMS MADE IT FOR YOU TO DO YOUR WORK, TAKE CARE OF THINGS AT HOME, OR GET ALONG WITH OTHER PEOPLE: VERY DIFFICULT
SUM OF ALL RESPONSES TO PHQ QUESTIONS 1-9: 8
5. POOR APPETITE OR OVEREATING: SEVERAL DAYS
4. FEELING TIRED OR HAVING LITTLE ENERGY: SEVERAL DAYS
6. FEELING BAD ABOUT YOURSELF - OR THAT YOU ARE A FAILURE OR HAVE LET YOURSELF OR YOUR FAMILY DOWN: NOT AT ALL
SUM OF ALL RESPONSES TO PHQ QUESTIONS 1-9: 17
7. TROUBLE CONCENTRATING ON THINGS, SUCH AS READING THE NEWSPAPER OR WATCHING TELEVISION: MORE THAN HALF THE DAYS
8. MOVING OR SPEAKING SO SLOWLY THAT OTHER PEOPLE COULD HAVE NOTICED. OR THE OPPOSITE, BEING SO FIGETY OR RESTLESS THAT YOU HAVE BEEN MOVING AROUND A LOT MORE THAN USUAL: NEARLY EVERY DAY
10. IF YOU CHECKED OFF ANY PROBLEMS, HOW DIFFICULT HAVE THESE PROBLEMS MADE IT FOR YOU TO DO YOUR WORK, TAKE CARE OF THINGS AT HOME, OR GET ALONG WITH OTHER PEOPLE: VERY DIFFICULT

## 2025-07-25 ASSESSMENT — ACTIVITIES OF DAILY LIVING (ADL): LACK_OF_TRANSPORTATION: NO

## 2025-08-01 ENCOUNTER — COMMUNITY OUTREACH (OUTPATIENT)
Dept: OTHER | Age: 55
End: 2025-08-01

## 2025-08-04 ENCOUNTER — COMMUNITY OUTREACH (OUTPATIENT)
Dept: OTHER | Age: 55
End: 2025-08-04

## 2025-08-12 ENCOUNTER — TELEPHONE (OUTPATIENT)
Dept: OTHER | Age: 55
End: 2025-08-12

## 2025-08-17 ENCOUNTER — HOSPITAL ENCOUNTER (EMERGENCY)
Age: 55
Discharge: HOME | End: 2025-08-17
Attending: EMERGENCY MEDICINE
Payer: COMMERCIAL

## 2025-08-17 ENCOUNTER — APPOINTMENT (OUTPATIENT)
Dept: CT IMAGING | Age: 55
End: 2025-08-17
Payer: COMMERCIAL

## 2025-08-17 VITALS
DIASTOLIC BLOOD PRESSURE: 115 MMHG | WEIGHT: 215 LBS | SYSTOLIC BLOOD PRESSURE: 157 MMHG | RESPIRATION RATE: 11 BRPM | TEMPERATURE: 98.4 F | HEIGHT: 76 IN | HEART RATE: 87 BPM | BODY MASS INDEX: 26.18 KG/M2 | OXYGEN SATURATION: 99 %

## 2025-08-17 DIAGNOSIS — R10.13 ABDOMINAL PAIN, EPIGASTRIC: ICD-10-CM

## 2025-08-17 DIAGNOSIS — R11.2 NAUSEA AND VOMITING, UNSPECIFIED VOMITING TYPE: Primary | ICD-10-CM

## 2025-08-17 LAB
ALBUMIN SERPL-MCNC: 4 G/DL (ref 3.4–5)
ALBUMIN/GLOB SERPL: 1.1 {RATIO} (ref 1.1–2.2)
ALP SERPL-CCNC: 102 U/L (ref 40–129)
ALT SERPL-CCNC: 48 U/L (ref 10–40)
ANION GAP SERPL CALCULATED.3IONS-SCNC: 17 MMOL/L (ref 3–16)
ANION GAP SERPL CALCULATED.3IONS-SCNC: 21 MMOL/L (ref 3–16)
AST SERPL-CCNC: 81 U/L (ref 15–37)
BASE EXCESS BLDV CALC-SCNC: 2.6 MMOL/L (ref -3–3)
BASOPHILS # BLD: 0 K/UL (ref 0–0.2)
BASOPHILS NFR BLD: 0.2 %
BILIRUB SERPL-MCNC: 1.2 MG/DL (ref 0–1)
BUN SERPL-MCNC: 15 MG/DL (ref 7–20)
BUN SERPL-MCNC: 15 MG/DL (ref 7–20)
CALCIUM SERPL-MCNC: 8.6 MG/DL (ref 8.3–10.6)
CALCIUM SERPL-MCNC: 9 MG/DL (ref 8.3–10.6)
CHLORIDE SERPL-SCNC: 88 MMOL/L (ref 99–110)
CHLORIDE SERPL-SCNC: 91 MMOL/L (ref 99–110)
CO2 BLDV-SCNC: 29 MMOL/L
CO2 SERPL-SCNC: 26 MMOL/L (ref 21–32)
CO2 SERPL-SCNC: 28 MMOL/L (ref 21–32)
COHGB MFR BLDV: 1.8 % (ref 0–1.5)
CREAT SERPL-MCNC: 0.7 MG/DL (ref 0.9–1.3)
CREAT SERPL-MCNC: 0.7 MG/DL (ref 0.9–1.3)
DEPRECATED RDW RBC AUTO: 15.3 % (ref 12.4–15.4)
EKG ATRIAL RATE: 61 BPM
EKG DIAGNOSIS: NORMAL
EKG P-R INTERVAL: 138 MS
EKG Q-T INTERVAL: 422 MS
EKG QRS DURATION: 84 MS
EKG QTC CALCULATION (BAZETT): 424 MS
EKG R AXIS: 155 DEGREES
EKG T AXIS: 139 DEGREES
EKG VENTRICULAR RATE: 61 BPM
EOSINOPHIL # BLD: 0 K/UL (ref 0–0.6)
EOSINOPHIL NFR BLD: 0.1 %
ETHANOLAMINE SERPL-MCNC: NORMAL MG/DL (ref 0–0.08)
GFR SERPLBLD CREATININE-BSD FMLA CKD-EPI: >90 ML/MIN/{1.73_M2}
GFR SERPLBLD CREATININE-BSD FMLA CKD-EPI: >90 ML/MIN/{1.73_M2}
GLUCOSE SERPL-MCNC: 147 MG/DL (ref 70–99)
GLUCOSE SERPL-MCNC: 166 MG/DL (ref 70–99)
HCO3 BLDV-SCNC: 27.6 MMOL/L (ref 23–29)
HCT VFR BLD AUTO: 44.7 % (ref 40.5–52.5)
HGB BLD-MCNC: 15.2 G/DL (ref 13.5–17.5)
LACTATE BLDV-SCNC: 1 MMOL/L (ref 0.4–2)
LIPASE SERPL-CCNC: 97 U/L (ref 13–60)
LYMPHOCYTES # BLD: 0.7 K/UL (ref 1–5.1)
LYMPHOCYTES NFR BLD: 12.2 %
MAGNESIUM SERPL-MCNC: 1.38 MG/DL (ref 1.8–2.4)
MAGNESIUM SERPL-MCNC: 1.68 MG/DL (ref 1.8–2.4)
MCH RBC QN AUTO: 29.2 PG (ref 26–34)
MCHC RBC AUTO-ENTMCNC: 33.9 G/DL (ref 31–36)
MCV RBC AUTO: 86 FL (ref 80–100)
METHGB MFR BLDV: 0.3 %
MONOCYTES # BLD: 0.6 K/UL (ref 0–1.3)
MONOCYTES NFR BLD: 10.3 %
NEUTROPHILS # BLD: 4.7 K/UL (ref 1.7–7.7)
NEUTROPHILS NFR BLD: 77.2 %
O2 CT VFR BLDV CALC: 16 VOL %
O2 THERAPY: ABNORMAL
PCO2 BLDV: 43.8 MMHG (ref 40–50)
PH BLDV: 7.42 [PH] (ref 7.35–7.45)
PLATELET # BLD AUTO: 99 K/UL (ref 135–450)
PLATELET BLD QL SMEAR: ABNORMAL
PMV BLD AUTO: 9.5 FL (ref 5–10.5)
PO2 BLDV: 38.5 MMHG (ref 25–40)
POTASSIUM SERPL-SCNC: 3.1 MMOL/L (ref 3.5–5.1)
POTASSIUM SERPL-SCNC: 3.3 MMOL/L (ref 3.5–5.1)
PROT SERPL-MCNC: 7.6 G/DL (ref 6.4–8.2)
RBC # BLD AUTO: 5.2 M/UL (ref 4.2–5.9)
RBC MORPH BLD: NORMAL
SAO2 % BLDV: 74 %
SLIDE REVIEW: ABNORMAL
SODIUM SERPL-SCNC: 135 MMOL/L (ref 136–145)
SODIUM SERPL-SCNC: 136 MMOL/L (ref 136–145)
TROPONIN, HIGH SENSITIVITY: 17 NG/L (ref 0–22)
WBC # BLD AUTO: 6.1 K/UL (ref 4–11)

## 2025-08-17 PROCEDURE — 96365 THER/PROPH/DIAG IV INF INIT: CPT

## 2025-08-17 PROCEDURE — 85025 COMPLETE CBC W/AUTO DIFF WBC: CPT

## 2025-08-17 PROCEDURE — 96366 THER/PROPH/DIAG IV INF ADDON: CPT

## 2025-08-17 PROCEDURE — 96375 TX/PRO/DX INJ NEW DRUG ADDON: CPT

## 2025-08-17 PROCEDURE — 84484 ASSAY OF TROPONIN QUANT: CPT

## 2025-08-17 PROCEDURE — 93010 ELECTROCARDIOGRAM REPORT: CPT | Performed by: INTERNAL MEDICINE

## 2025-08-17 PROCEDURE — 83690 ASSAY OF LIPASE: CPT

## 2025-08-17 PROCEDURE — 82077 ASSAY SPEC XCP UR&BREATH IA: CPT

## 2025-08-17 PROCEDURE — 6360000002 HC RX W HCPCS: Performed by: EMERGENCY MEDICINE

## 2025-08-17 PROCEDURE — 6360000004 HC RX CONTRAST MEDICATION: Performed by: EMERGENCY MEDICINE

## 2025-08-17 PROCEDURE — 93005 ELECTROCARDIOGRAM TRACING: CPT | Performed by: EMERGENCY MEDICINE

## 2025-08-17 PROCEDURE — 83605 ASSAY OF LACTIC ACID: CPT

## 2025-08-17 PROCEDURE — 83735 ASSAY OF MAGNESIUM: CPT

## 2025-08-17 PROCEDURE — 82803 BLOOD GASES ANY COMBINATION: CPT

## 2025-08-17 PROCEDURE — 6370000000 HC RX 637 (ALT 250 FOR IP): Performed by: EMERGENCY MEDICINE

## 2025-08-17 PROCEDURE — 99285 EMERGENCY DEPT VISIT HI MDM: CPT

## 2025-08-17 PROCEDURE — 74177 CT ABD & PELVIS W/CONTRAST: CPT

## 2025-08-17 PROCEDURE — 96361 HYDRATE IV INFUSION ADD-ON: CPT

## 2025-08-17 PROCEDURE — 36415 COLL VENOUS BLD VENIPUNCTURE: CPT

## 2025-08-17 PROCEDURE — 80053 COMPREHEN METABOLIC PANEL: CPT

## 2025-08-17 PROCEDURE — 2580000003 HC RX 258: Performed by: EMERGENCY MEDICINE

## 2025-08-17 PROCEDURE — 2500000003 HC RX 250 WO HCPCS: Performed by: EMERGENCY MEDICINE

## 2025-08-17 RX ORDER — DICYCLOMINE HYDROCHLORIDE 10 MG/1
10 CAPSULE ORAL
Qty: 120 CAPSULE | Refills: 0 | Status: SHIPPED | OUTPATIENT
Start: 2025-08-17

## 2025-08-17 RX ORDER — METOCLOPRAMIDE HYDROCHLORIDE 5 MG/ML
10 INJECTION INTRAMUSCULAR; INTRAVENOUS ONCE
Status: COMPLETED | OUTPATIENT
Start: 2025-08-17 | End: 2025-08-17

## 2025-08-17 RX ORDER — ONDANSETRON 4 MG/1
4 TABLET, ORALLY DISINTEGRATING ORAL 3 TIMES DAILY PRN
Qty: 21 TABLET | Refills: 0 | Status: SHIPPED | OUTPATIENT
Start: 2025-08-17

## 2025-08-17 RX ORDER — PROMETHAZINE HYDROCHLORIDE 25 MG/ML
6.25 INJECTION, SOLUTION INTRAMUSCULAR; INTRAVENOUS ONCE
Status: DISCONTINUED | OUTPATIENT
Start: 2025-08-17 | End: 2025-08-18 | Stop reason: HOSPADM

## 2025-08-17 RX ORDER — IOPAMIDOL 755 MG/ML
75 INJECTION, SOLUTION INTRAVASCULAR
Status: COMPLETED | OUTPATIENT
Start: 2025-08-17 | End: 2025-08-17

## 2025-08-17 RX ORDER — FAMOTIDINE 20 MG/1
20 TABLET, FILM COATED ORAL 2 TIMES DAILY
Qty: 60 TABLET | Refills: 3 | Status: SHIPPED | OUTPATIENT
Start: 2025-08-17

## 2025-08-17 RX ORDER — MORPHINE SULFATE 4 MG/ML
4 INJECTION, SOLUTION INTRAMUSCULAR; INTRAVENOUS
Refills: 0 | Status: COMPLETED | OUTPATIENT
Start: 2025-08-17 | End: 2025-08-17

## 2025-08-17 RX ORDER — MORPHINE SULFATE 4 MG/ML
4 INJECTION, SOLUTION INTRAMUSCULAR; INTRAVENOUS
Refills: 0 | Status: DISCONTINUED | OUTPATIENT
Start: 2025-08-17 | End: 2025-08-17

## 2025-08-17 RX ORDER — PROMETHAZINE HYDROCHLORIDE 25 MG/1
25 TABLET ORAL 4 TIMES DAILY PRN
Qty: 20 TABLET | Refills: 0 | Status: SHIPPED | OUTPATIENT
Start: 2025-08-17 | End: 2025-08-24

## 2025-08-17 RX ORDER — 0.9 % SODIUM CHLORIDE 0.9 %
1000 INTRAVENOUS SOLUTION INTRAVENOUS ONCE
Status: COMPLETED | OUTPATIENT
Start: 2025-08-17 | End: 2025-08-17

## 2025-08-17 RX ORDER — MAGNESIUM SULFATE IN WATER 40 MG/ML
2000 INJECTION, SOLUTION INTRAVENOUS ONCE
Status: COMPLETED | OUTPATIENT
Start: 2025-08-17 | End: 2025-08-17

## 2025-08-17 RX ORDER — KETOROLAC TROMETHAMINE 15 MG/ML
15 INJECTION, SOLUTION INTRAMUSCULAR; INTRAVENOUS ONCE
Status: COMPLETED | OUTPATIENT
Start: 2025-08-17 | End: 2025-08-17

## 2025-08-17 RX ORDER — ONDANSETRON 2 MG/ML
4 INJECTION INTRAMUSCULAR; INTRAVENOUS ONCE
Status: COMPLETED | OUTPATIENT
Start: 2025-08-17 | End: 2025-08-17

## 2025-08-17 RX ADMIN — SODIUM CHLORIDE 1000 ML: 0.9 INJECTION, SOLUTION INTRAVENOUS at 16:12

## 2025-08-17 RX ADMIN — ONDANSETRON 4 MG: 2 INJECTION, SOLUTION INTRAMUSCULAR; INTRAVENOUS at 16:13

## 2025-08-17 RX ADMIN — MORPHINE SULFATE 4 MG: 4 INJECTION, SOLUTION INTRAMUSCULAR; INTRAVENOUS at 16:13

## 2025-08-17 RX ADMIN — MAGNESIUM SULFATE HEPTAHYDRATE 2000 MG: 40 INJECTION, SOLUTION INTRAVENOUS at 20:10

## 2025-08-17 RX ADMIN — FAMOTIDINE 20 MG: 10 INJECTION, SOLUTION INTRAVENOUS at 16:13

## 2025-08-17 RX ADMIN — IOPAMIDOL 75 ML: 755 INJECTION, SOLUTION INTRAVENOUS at 16:25

## 2025-08-17 RX ADMIN — KETOROLAC TROMETHAMINE 15 MG: 15 INJECTION, SOLUTION INTRAMUSCULAR; INTRAVENOUS at 18:18

## 2025-08-17 RX ADMIN — METOCLOPRAMIDE 10 MG: 5 INJECTION, SOLUTION INTRAMUSCULAR; INTRAVENOUS at 18:03

## 2025-08-17 RX ADMIN — LIDOCAINE HYDROCHLORIDE: 20 SOLUTION ORAL at 20:06

## 2025-08-17 RX ADMIN — SODIUM CHLORIDE 1000 ML: 0.9 INJECTION, SOLUTION INTRAVENOUS at 20:08

## 2025-08-17 ASSESSMENT — PAIN - FUNCTIONAL ASSESSMENT
PAIN_FUNCTIONAL_ASSESSMENT: 0-10
PAIN_FUNCTIONAL_ASSESSMENT: 0-10

## 2025-08-17 ASSESSMENT — PAIN SCALES - GENERAL
PAINLEVEL_OUTOF10: 5
PAINLEVEL_OUTOF10: 3
PAINLEVEL_OUTOF10: 4

## 2025-08-17 ASSESSMENT — PAIN DESCRIPTION - LOCATION
LOCATION: ABDOMEN
LOCATION: ABDOMEN

## 2025-08-19 ENCOUNTER — COMMUNITY OUTREACH (OUTPATIENT)
Dept: OTHER | Age: 55
End: 2025-08-19

## 2025-09-02 ASSESSMENT — ENCOUNTER SYMPTOMS
SHORTNESS OF BREATH: 0
DIARRHEA: 0
NAUSEA: 0
VOMITING: 0
CONSTIPATION: 0

## 2025-09-05 ENCOUNTER — OFFICE VISIT (OUTPATIENT)
Dept: PRIMARY CARE CLINIC | Age: 55
End: 2025-09-05

## 2025-09-05 VITALS
BODY MASS INDEX: 26.49 KG/M2 | WEIGHT: 217.6 LBS | SYSTOLIC BLOOD PRESSURE: 112 MMHG | HEART RATE: 91 BPM | DIASTOLIC BLOOD PRESSURE: 74 MMHG | OXYGEN SATURATION: 96 %

## 2025-09-05 DIAGNOSIS — F10.10 ALCOHOL ABUSE: ICD-10-CM

## 2025-09-05 DIAGNOSIS — K42.9 UMBILICAL HERNIA WITHOUT OBSTRUCTION AND WITHOUT GANGRENE: ICD-10-CM

## 2025-09-05 DIAGNOSIS — I48.91 ATRIAL FIBRILLATION WITH RVR (HCC): Primary | ICD-10-CM

## 2025-09-05 DIAGNOSIS — Z23 NEED FOR TDAP VACCINATION: ICD-10-CM

## 2025-09-05 DIAGNOSIS — Z23 NEED FOR PNEUMOCOCCAL VACCINATION: ICD-10-CM

## 2025-09-05 DIAGNOSIS — R16.0 HEPATOMEGALY: ICD-10-CM

## 2025-09-05 DIAGNOSIS — R73.03 PREDIABETES: ICD-10-CM

## 2025-09-05 DIAGNOSIS — F39 MOOD DISORDER: ICD-10-CM

## 2025-09-05 RX ORDER — LANOLIN ALCOHOL/MO/W.PET/CERES
100 CREAM (GRAM) TOPICAL DAILY
Qty: 90 TABLET | Refills: 0 | Status: SHIPPED | OUTPATIENT
Start: 2025-09-05

## (undated) DEVICE — PAD, DEFIB, ADULT, RADIOTRANS, ZOLL: Brand: MEDLINE

## (undated) DEVICE — CONMED SCOPE SAVER BITE BLOCK, 20X27 MM: Brand: SCOPE SAVER

## (undated) DEVICE — ENDO CARRY-ON PROCEDURE KIT INCLUDES SUCTION TUBING, LUBRICANT, GAUZE, BIOHAZARD STICKER, TRANSPORT PAD AND INTERCEPT BEDSIDE KIT.: Brand: ENDO CARRY-ON PROCEDURE KIT